# Patient Record
Sex: FEMALE | Race: OTHER
[De-identification: names, ages, dates, MRNs, and addresses within clinical notes are randomized per-mention and may not be internally consistent; named-entity substitution may affect disease eponyms.]

---

## 2017-09-08 ENCOUNTER — HOSPITAL ENCOUNTER (OUTPATIENT)
Dept: HOSPITAL 8 - CFH | Age: 37
Discharge: HOME | End: 2017-09-08
Attending: INTERNAL MEDICINE
Payer: COMMERCIAL

## 2017-09-08 DIAGNOSIS — I07.1: Primary | ICD-10-CM

## 2017-09-08 PROCEDURE — 93306 TTE W/DOPPLER COMPLETE: CPT

## 2018-11-09 ENCOUNTER — NON-PROVIDER VISIT (OUTPATIENT)
Dept: OBGYN | Facility: CLINIC | Age: 38
End: 2018-11-09

## 2018-11-09 DIAGNOSIS — N92.6 MISSED PERIOD: ICD-10-CM

## 2018-11-09 LAB
INT CON NEG: NEGATIVE
INT CON POS: POSITIVE
POC URINE PREGNANCY TEST: POSITIVE

## 2018-11-09 PROCEDURE — 81025 URINE PREGNANCY TEST: CPT | Performed by: OBSTETRICS & GYNECOLOGY

## 2018-12-18 ENCOUNTER — INITIAL PRENATAL (OUTPATIENT)
Dept: OBGYN | Facility: CLINIC | Age: 38
End: 2018-12-18
Payer: COMMERCIAL

## 2018-12-18 VITALS — HEIGHT: 62 IN | BODY MASS INDEX: 34.6 KG/M2 | WEIGHT: 188 LBS

## 2018-12-18 DIAGNOSIS — Z32.01 ENCOUNTER FOR PREGNANCY TEST, RESULT POSITIVE: ICD-10-CM

## 2018-12-18 DIAGNOSIS — N92.6 MISSED MENSES: ICD-10-CM

## 2018-12-18 DIAGNOSIS — O09.299 HISTORY OF POSTPARTUM HEMORRHAGE, CURRENTLY PREGNANT: ICD-10-CM

## 2018-12-18 PROCEDURE — 76830 TRANSVAGINAL US NON-OB: CPT | Performed by: OBSTETRICS & GYNECOLOGY

## 2018-12-18 PROCEDURE — 99202 OFFICE O/P NEW SF 15 MIN: CPT | Mod: 25 | Performed by: OBSTETRICS & GYNECOLOGY

## 2018-12-18 RX ORDER — HYDROXYZINE HYDROCHLORIDE 10 MG/1
10 TABLET, FILM COATED ORAL
Qty: 30 TAB | Refills: 5 | Status: SHIPPED | OUTPATIENT
Start: 2018-12-18 | End: 2019-05-18

## 2018-12-18 NOTE — PROGRESS NOTES
today. Mary about LMP  On PNV  Phone # [phone]  Pharmacy verified with patient  C/o difficulty sleeping and feeling very anxious   LH=[id number]

## 2018-12-18 NOTE — PROGRESS NOTES
Cc: Confirmation of pregnancy    HPI:  The patient is a 38 y.o.  10w2d based upon  Patient's last menstrual period was 10/07/2018.. She was using not birth control method. This was an un planned pregnancy.   She complains of inab ilit y to sleep and anxiety at night. She had a therapist and declines a referral at this time but is requesting something to help her sleep.  She presents for a confirmation of pregnancy.  She denies  fetal movement,  denies  vaginal bleeding,  denies  leakage of fluid,  denies contractions.   She denies nausea/vomiting, denies headache, and denies dysuria.      Review of systems:  Pertinent positives documented in HPI and all other systems reviewed & are negative    Gyn History: LMP1. regular q 28-30 days    Pregnancy related complications:  Hx PPH, Hx 36wks SROM    OB History    Para Term  AB Living   5 4 3 1   4   SAB TAB Ectopic Molar Multiple Live Births             4      # Outcome Date GA Lbr Alejandro/2nd Weight Sex Delivery Anes PTL Lv   5 Current            4 Term 11/03/10 39w0d  3.884 kg (8 lb 9 oz) M Vag-Spont None N NARDA      Complications: Postpartum hemorrhage      Birth Comments: HEMORRAGE, BREASTFEEDING    3 Term 08 40w0d 02:00 3.43 kg (7 lb 9 oz) M Vag-Spont None Y NARDA      Complications: Postpartum hemorrhage      Birth Comments: HTN at 38 weeks- no meds per pt. .    2  04 36w0d 00:30 3.345 kg (7 lb 6 oz) M Vag-Spont None N NARDA      Birth Comments: SROM 36weeks, baby stayed in ICU x 1 week   1 Term 03 40w0d 08:00 3.402 kg (7 lb 8 oz) M SPONTANEOUS EPI  NARDA        Past Medical History:   Diagnosis Date   • History of  delivery, currently pregnant 4/15/2010   • Supervision of normal pregnancy 4/15/2010   • Varicose vein of leg 4/15/2010     History reviewed. No pertinent surgical history.  Social History     Social History   • Marital status: Single     Spouse name: N/A   • Number of children: N/A   • Years of education:  "N/A     Occupational History   • Not on file.     Social History Main Topics   • Smoking status: Never Smoker   • Smokeless tobacco: Never Used   • Alcohol use No   • Drug use: No   • Sexual activity: Yes     Partners: Male     Other Topics Concern   • Not on file     Social History Narrative   • No narrative on file     Family History   Problem Relation Age of Onset   • Diabetes Paternal Grandmother         NIDDM   • Hypertension Paternal Grandmother      Allergies:   Allergies as of 12/18/2018 - Reviewed 12/18/2018   Allergen Reaction Noted   • Nkda [no known drug allergy]  11/03/2010       PE:    Height 1.575 m (5' 2\"), weight 85.3 kg (188 lb), last menstrual period 10/07/2018, currently breastfeeding.      General:appears stated age, is in no apparent distress  Head: normocephalic, non-tender  Neck: neck is supple, no jugular venous distension, no thyromegaly  Abdomen: Bowel sounds positive, nondistended, soft, nontender x4, no rebound or guarding. No organomegaly. No masses.  Female GYN: normal female external genitalia without lesions   exam deferred  Skin: No rashes, or ulcers or lesions seen  Psychiatric: Patient shows appropriate affect, is alert and oriented x3, intact judgment and insight.    transvaginal scan and per my read:    Indication: missed menses. Normal    Findings: amanda intrauterine pregnancy @ 10-6 weeks by CRL. Positive yolk sac. Positive fetal cardiac activity @ 173 BPM. Right ovary normal. Left Ovary normal. Cervical length 3.91cm. No free fluid in the cul-de-sac.    Impression: viable IUP @ 10-6 weeks. EDC by US of 7/10/2019    DATING: will keep SALVATORE By LMP of 7/14/2019    A/P:   1. Missed menses  REFERRAL TO PERINATOLOGY   2. History of postpartum hemorrhage, currently pregnant     3. Encounter for pregnancy test, result positive       Will send Atarax prn for sleep to pharmacy  Spent 20 minutes in face-to-face patient contact in which greater than 50% of that visit was spent in " counseling/coordination of care of newly diagnosed pregnancy including medical and surgical options of care.  1st trimester screening for Down Syndrome and neural tube defects discussed.  Patient will be referred to [facility] for AMA  SAB precautions discussed  F/u in 4 weeks for new OB visit  Increase water intake and encouraged healthy nutrition.  Begin prenatal vitamins.

## 2018-12-19 NOTE — PROGRESS NOTES
Referral faxed to Dr. Onofre on 12/19/18. They will contact patient to schedule appt. Please check with patient if appt was made and document.

## 2019-01-16 ENCOUNTER — INITIAL PRENATAL (OUTPATIENT)
Dept: OBGYN | Facility: CLINIC | Age: 39
End: 2019-01-16
Payer: COMMERCIAL

## 2019-01-16 ENCOUNTER — HOSPITAL ENCOUNTER (OUTPATIENT)
Facility: MEDICAL CENTER | Age: 39
End: 2019-01-16
Attending: NURSE PRACTITIONER
Payer: COMMERCIAL

## 2019-01-16 VITALS — DIASTOLIC BLOOD PRESSURE: 60 MMHG | BODY MASS INDEX: 34.93 KG/M2 | SYSTOLIC BLOOD PRESSURE: 118 MMHG | WEIGHT: 191 LBS

## 2019-01-16 DIAGNOSIS — Z34.80 SUPERVISION OF OTHER NORMAL PREGNANCY, ANTEPARTUM: Primary | ICD-10-CM

## 2019-01-16 PROBLEM — O09.892 SUPERVISION OF OTHER HIGH RISK PREGNANCIES, SECOND TRIMESTER: Status: ACTIVE | Noted: 2019-01-16

## 2019-01-16 PROBLEM — O09.522 ELDERLY MULTIGRAVIDA IN SECOND TRIMESTER: Status: ACTIVE | Noted: 2019-01-16

## 2019-01-16 LAB
APPEARANCE UR: NORMAL
BILIRUB UR STRIP-MCNC: NORMAL MG/DL
COLOR UR AUTO: NORMAL
GLUCOSE UR STRIP.AUTO-MCNC: NEGATIVE MG/DL
KETONES UR STRIP.AUTO-MCNC: NEGATIVE MG/DL
LEUKOCYTE ESTERASE UR QL STRIP.AUTO: NEGATIVE
NITRITE UR QL STRIP.AUTO: NEGATIVE
PH UR STRIP.AUTO: 7.5 [PH] (ref 5–8)
PROT UR QL STRIP: NEGATIVE MG/DL
RBC UR QL AUTO: NEGATIVE
SP GR UR STRIP.AUTO: 1.01
UROBILINOGEN UR STRIP-MCNC: NORMAL MG/DL

## 2019-01-16 PROCEDURE — 87591 N.GONORRHOEAE DNA AMP PROB: CPT

## 2019-01-16 PROCEDURE — 81002 URINALYSIS NONAUTO W/O SCOPE: CPT | Performed by: NURSE PRACTITIONER

## 2019-01-16 PROCEDURE — 59401 PR NEW OB VISIT: CPT | Performed by: NURSE PRACTITIONER

## 2019-01-16 PROCEDURE — 87491 CHLMYD TRACH DNA AMP PROBE: CPT

## 2019-01-16 PROCEDURE — 88175 CYTOPATH C/V AUTO FLUID REDO: CPT

## 2019-01-16 NOTE — PROGRESS NOTES
Pt. Here for NOB visit today.  # [phone]  Pt had a  visit on 12/18/18  Pt. states went to [facility] on 1/2/19 for pelvic pain and pressure, denies bleeding.   Pharmacy verified.   Chaperone offered and provided.   AFP refused, refusal form signed and scanned in media  Pt has JULIO, was given a copy of her lab slip and told to pay the fee at the lab.    Flu vaccine offered and declined.

## 2019-01-16 NOTE — LETTER
Estudios Para Detectar los Portadores de la Fibrosis Quística   (La Enfermedad Fibroquística del Páncreas)    [facility]    Esta información esta relacionada con un examen de larry que puede determinar si usted o stauffer lizzette es portador del gen que causa la enfermedad hereditaria que se llama la fibrosis quística.     ¿QUE ES LA ENFERMEDAD FIBROSIS QUÍSTICA?  · La  fibrosis quística es roddy enfermedad hereditaria que afecta a más de 25,000 niños y jóvenes adultos americanos.  · Los síntomas de la fibrosis quística varían de roddy persona a otra.  Los síntomas más comunes son congestión pulmonar, diarrea y retraso en el crecimiento del teagan.  La mayoría de las personas con la fibrosis quística padecen de problemas médicos muy severos, y algunas mueren jóvenes.  Otras tienen tan pocos síntomas que no se percatan de que tienen fibrosis quística.  · La fibrosis quística no afecta en absoluto la inteligencia de la persona.  · Aunque actualmente no hay roddy dudley para la fibrosis quística, los científicos están avanzando con respecto a nuevos tratamientos y en la búsqueda de la dudley.  Anteriormente la mayoría de las personas que padecían de fibrosis quística morían muy jóvenes.  Hoy en día, muchas personas que padecen de la fibrosis quística sobreviven hasta los 20 o 30 anos de edad.    ¿EXISTE LA POSIBILIDAD DE QUE MI BHARAT PUEDA TENER FIBROSIS QUÍSTICA?  · Usted puede tener un hijo con la fibrosis quística aun cuando no hay nadie en stauffer roseanne que la padezca.  (Larry la grafica que sigue.)  · Existe roddy prueba que puede ayudarle a determinar si flory un gen para la fibrosis quística y si por eso corre un riesgo de tener un hijo con la enfermedad.  · Si ambos padres son portadores del gen para la fibrosis quística, hay roddy (1) probabilidad entre 4 (25%) en cada embarazo, de que puedan tener un hijo afectada con fibrosis quística.  · Los portadores del gen para la fibrosis quística tienen roddy copia del gen  normal y el otro gen alterado.  · Las personas con fibrosis quística tienen dos genes alterados para la fibrosis quística,  · Normalmente la mayoría de individuos tienen dos copias normales del gen para fibrosis quística.    Riesgo aproximado de que roddy lizzette sin familiares con fibrosis quística pueda tener un hijo con fibrosis quística:  Origen / Riesgo  Roddy lizzette Caucásica (de anca dorothy):  1 en 2,500  Roddy lizzette :  1 en 8,000  Roddy lizzette Afroamericana (de anca ela):  1 en 15,000  Roddy lizzette Asiática:  1 en 32,000    ¿EXISTEN PRUEBAS PARA DETECTAR LOS PORTADORES DE LA FIBROSIS QUÍSTICA?  · Hay roddy prueba de larry para determinar si usted o stauffer lizzette portan el gen para la fibrosis quística.  · Es importante entender que la prueba no detecta todos los portadores del gen para la fibrosis quística.  · Si la prueba determina que ambos padres con portadores, se puede realizar otras pruebas para determinar si stauffer futuro tia esta afectado.    ¿CUANTO ANAT LA PRUEBA PARA DETERMINAR SI SOY PORTADOR(A) DEL GEN PARA LA FIBROSIS QUÍSTICA?  · El costo de la prueba varia según stauffer póliza de seguro medico y el laboratorio donde se efectúa el análisis.  · El costo promedio de la prueba es de $300.  · Stauffer consejera genética puede darle mas información acera de esta prueba para determinar si usted es portador de la fibrosis quística.    _____  Si, yo estoy interesada y me gustaria obtener mas información al respecto.  _____  No tengo interés ni en hacerme la prueba para determinar si soy portador(a) de gen para la fibrosis quística ni en recibir mas información al respecto.    Firma del paciente: _____________________________   1/16/2019

## 2019-01-16 NOTE — LETTER
Estudios Para Detectar los Portadores de la Fibrosis Quística   (La Enfermedad Fibroquística del Páncreas)    [facility]    Esta información esta relacionada con un examen de larry que puede determinar si usted o stauffer lizzette es portador del gen que causa la enfermedad hereditaria que se llama la fibrosis quística.     ¿QUE ES LA ENFERMEDAD FIBROSIS QUÍSTICA?  · La  fibrosis quística es roddy enfermedad hereditaria que afecta a más de 25,000 niños y jóvenes adultos americanos.  · Los síntomas de la fibrosis quística varían de roddy persona a otra.  Los síntomas más comunes son congestión pulmonar, diarrea y retraso en el crecimiento del teagan.  La mayoría de las personas con la fibrosis quística padecen de problemas médicos muy severos, y algunas mueren jóvenes.  Otras tienen tan pocos síntomas que no se percatan de que tienen fibrosis quística.  · La fibrosis quística no afecta en absoluto la inteligencia de la persona.  · Aunque actualmente no hay roddy dudley para la fibrosis quística, los científicos están avanzando con respecto a nuevos tratamientos y en la búsqueda de la dudley.  Anteriormente la mayoría de las personas que padecían de fibrosis quística morían muy jóvenes.  Hoy en día, muchas personas que padecen de la fibrosis quística sobreviven hasta los 20 o 30 anos de edad.    ¿EXISTE LA POSIBILIDAD DE QUE MI BHARAT PUEDA TENER FIBROSIS QUÍSTICA?  · Usted puede tener un hijo con la fibrosis quística aun cuando no hay nadie en stauffer roseanne que la padezca.  (Larry la grafica que sigue.)  · Existe roddy prueba que puede ayudarle a determinar si flory un gen para la fibrosis quística y si por eso corre un riesgo de tener un hijo con la enfermedad.  · Si ambos padres son portadores del gen para la fibrosis quística, hay roddy (1) probabilidad entre 4 (25%) en cada embarazo, de que puedan tener un hijo afectada con fibrosis quística.  · Los portadores del gen para la fibrosis quística tienen roddy copia del gen  normal y el otro gen alterado.  · Las personas con fibrosis quística tienen dos genes alterados para la fibrosis quística,  · Normalmente la mayoría de individuos tienen dos copias normales del gen para fibrosis quística.    Riesgo aproximado de que roddy lizzette sin familiares con fibrosis quística pueda tener un hijo con fibrosis quística:  Origen / Riesgo  Roddy lizzette Caucásica (de anca dorothy):  1 en 2,500  Roddy lizzette :  1 en 8,000  Roddy lizzette Afroamericana (de anca ela):  1 en 15,000  Roddy lizzette Asiática:  1 en 32,000    ¿EXISTEN PRUEBAS PARA DETECTAR LOS PORTADORES DE LA FIBROSIS QUÍSTICA?  · Hay roddy prueba de larry para determinar si usted o stauffer lizzette portan el gen para la fibrosis quística.  · Es importante entender que la prueba no detecta todos los portadores del gen para la fibrosis quística.  · Si la prueba determina que ambos padres con portadores, se puede realizar otras pruebas para determinar si stauffer futuro tia esta afectado.    ¿CUANTO ANAT LA PRUEBA PARA DETERMINAR SI SOY PORTADOR(A) DEL GEN PARA LA FIBROSIS QUÍSTICA?  · El costo de la prueba varia según stauffer póliza de seguro medico y el laboratorio donde se efectúa el análisis.  · El costo promedio de la prueba es de $300.  · Stauffer consejera genética puede darle mas información acera de esta prueba para determinar si usted es portador de la fibrosis quística.    _____  Si, yo estoy interesada y me gustaria obtener mas información al respecto.  _____  No tengo interés ni en hacerme la prueba para determinar si soy portador(a) de gen para la fibrosis quística ni en recibir mas información al respecto.    Firma del paciente: _____________________________   1/16/2019

## 2019-01-16 NOTE — PROGRESS NOTES
Subjective:   Claire Cobian is a 38 y.o.   @ EGA: 14w3d SALVATORE: Estimated Date of Delivery: 19  per US who presents for her new OB exam.  She reports being seen at [facility] for pelvic pain and told she may have a fibroid.  Declines AFP.  Declines CF.  Reports good FM.  Denies VB, LOF, or cramping.  Denies dysuria, vaginal DC.  Pt is  and lives with  and children. Not presently working.  Pregnancy is unplanned but wanted.       Initial Prenatal Visit        HPI  Review of Systems   All other systems reviewed and are negative.       Objective:     Wt 86.6 kg (191 lb)   LMP 10/07/2018   BMI 34.93 kg/m²    Wet mount: deferred  FHTs: 158  Fundal ht: 14     Physical Exam   Constitutional: She is oriented to person, place, and time. She appears well-developed and well-nourished.   HENT:   Head: Normocephalic and atraumatic.   Eyes:   Eye and ear exam deferred   Neck: Normal range of motion. Neck supple. No thyromegaly present.   Cardiovascular: Normal rate, regular rhythm, normal heart sounds and intact distal pulses.    Pulmonary/Chest: Effort normal and breath sounds normal. Right breast exhibits no inverted nipple, no mass, no nipple discharge, no skin change and no tenderness. Left breast exhibits no inverted nipple, no mass, no nipple discharge, no skin change and no tenderness.   Abdominal: Soft. Bowel sounds are normal.   Genitourinary: Vagina normal and uterus normal. Pelvic exam was performed with patient supine. There is no rash, tenderness, lesion or injury on the right labia. There is no rash, tenderness, lesion or injury on the left labia. Cervix exhibits no motion tenderness, no discharge and no friability. Right adnexum displays no mass, no tenderness and no fullness. Left adnexum displays no mass, no tenderness and no fullness.   Genitourinary Comments:  x 4 - proven to 3800g   Musculoskeletal: Normal range of motion.   Neurological: She is alert and oriented  to person, place, and time.   Skin: Skin is warm and dry. Capillary refill takes less than 2 seconds.   Psychiatric: She has a normal mood and affect. Her behavior is normal. Judgment and thought content normal.   Nursing note and vitals reviewed.          A:   1.  IUP @ 14w3d SALVATORE: Estimated Date of Delivery: 7/14/19 per US         2.  S=D        3.    Patient Active Problem List    Diagnosis Date Noted   • Supervision of other high risk pregnancies, second trimester 01/16/2019   • Elderly multigravida in second trimester 01/16/2019   • History of postpartum hemorrhage, currently pregnant 12/18/2018   • HTN (hypertension)-late last pregnancy without meds.  10/28/2010   • Acid reflux 04/15/2010   • Varicose vein of leg 04/15/2010     P:  1.  GC/CT done. Pap done.         2.  Prenatal labs ordered - lab slip given        3.  Discussed PNV, diet, and adequate water intake        4.  NOB packet given        5.  Return to office in 4 wks        6.  Complete OB US in 6 wks        7.  Oki appt declined since pt will be paying out of pocket.         8.  Flu vaccine declined.         9.  D/w pt helps for pelvic pain - rec'd pregnancy belt.       10.  Rec'd compression hose for varicose veins.          Chaperone offered and provided by Nyasia Schneider MA.

## 2019-01-17 DIAGNOSIS — Z34.80 SUPERVISION OF OTHER NORMAL PREGNANCY, ANTEPARTUM: ICD-10-CM

## 2019-01-17 LAB
C TRACH DNA GENITAL QL NAA+PROBE: NEGATIVE
CYTOLOGY REG CYTOL: NORMAL
N GONORRHOEA DNA GENITAL QL NAA+PROBE: NEGATIVE
SPECIMEN SOURCE: NORMAL

## 2019-01-17 NOTE — PATIENT INSTRUCTIONS
P:  1.  GC/CT done. Pap done.         2.  Prenatal labs ordered - lab slip given        3.  Discussed PNV, diet, and adequate water intake        4.  NOB packet given        5.  Return to office in 4 wks        6.  Complete OB US in 6 wks        7.  Oki appt declined since pt will be paying out of pocket.         8.  Flu vaccine declined.         9.  D/w pt helps for pelvic pain - rec'd pregnancy belt.       10.  Rec'd compression hose for varicose veins.

## 2019-01-23 ENCOUNTER — APPOINTMENT (OUTPATIENT)
Dept: OBGYN | Facility: CLINIC | Age: 39
End: 2019-01-23
Payer: COMMERCIAL

## 2019-02-11 ENCOUNTER — HOSPITAL ENCOUNTER (OUTPATIENT)
Dept: LAB | Facility: MEDICAL CENTER | Age: 39
End: 2019-02-11
Attending: NURSE PRACTITIONER
Payer: COMMERCIAL

## 2019-02-11 DIAGNOSIS — Z34.80 SUPERVISION OF OTHER NORMAL PREGNANCY, ANTEPARTUM: ICD-10-CM

## 2019-02-11 LAB
ABO GROUP BLD: NORMAL
APPEARANCE UR: CLEAR
BACTERIA #/AREA URNS HPF: ABNORMAL /HPF
BASOPHILS # BLD AUTO: 0.5 % (ref 0–1.8)
BASOPHILS # BLD: 0.05 K/UL (ref 0–0.12)
BILIRUB UR QL STRIP.AUTO: NEGATIVE
BLD GP AB SCN SERPL QL: NORMAL
COLOR UR: YELLOW
EOSINOPHIL # BLD AUTO: 0.16 K/UL (ref 0–0.51)
EOSINOPHIL NFR BLD: 1.6 % (ref 0–6.9)
EPI CELLS #/AREA URNS HPF: ABNORMAL /HPF
ERYTHROCYTE [DISTWIDTH] IN BLOOD BY AUTOMATED COUNT: 42.6 FL (ref 35.9–50)
GLUCOSE UR STRIP.AUTO-MCNC: NEGATIVE MG/DL
HBV SURFACE AG SER QL: NEGATIVE
HCT VFR BLD AUTO: 38.9 % (ref 37–47)
HGB BLD-MCNC: 13.1 G/DL (ref 12–16)
HIV 1+2 AB+HIV1 P24 AG SERPL QL IA: NON REACTIVE
HYALINE CASTS #/AREA URNS LPF: ABNORMAL /LPF
IMM GRANULOCYTES # BLD AUTO: 0.02 K/UL (ref 0–0.11)
IMM GRANULOCYTES NFR BLD AUTO: 0.2 % (ref 0–0.9)
KETONES UR STRIP.AUTO-MCNC: NEGATIVE MG/DL
LEUKOCYTE ESTERASE UR QL STRIP.AUTO: ABNORMAL
LYMPHOCYTES # BLD AUTO: 2.37 K/UL (ref 1–4.8)
LYMPHOCYTES NFR BLD: 24.1 % (ref 22–41)
MCH RBC QN AUTO: 30.6 PG (ref 27–33)
MCHC RBC AUTO-ENTMCNC: 33.7 G/DL (ref 33.6–35)
MCV RBC AUTO: 90.9 FL (ref 81.4–97.8)
MICRO URNS: ABNORMAL
MONOCYTES # BLD AUTO: 0.64 K/UL (ref 0–0.85)
MONOCYTES NFR BLD AUTO: 6.5 % (ref 0–13.4)
NEUTROPHILS # BLD AUTO: 6.59 K/UL (ref 2–7.15)
NEUTROPHILS NFR BLD: 67.1 % (ref 44–72)
NITRITE UR QL STRIP.AUTO: NEGATIVE
NRBC # BLD AUTO: 0 K/UL
NRBC BLD-RTO: 0 /100 WBC
PH UR STRIP.AUTO: 8 [PH]
PLATELET # BLD AUTO: 217 K/UL (ref 164–446)
PMV BLD AUTO: 11.6 FL (ref 9–12.9)
PROT UR QL STRIP: NEGATIVE MG/DL
RBC # BLD AUTO: 4.28 M/UL (ref 4.2–5.4)
RBC # URNS HPF: ABNORMAL /HPF
RBC UR QL AUTO: NEGATIVE
RH BLD: NORMAL
RUBV AB SER QL: 196.6 IU/ML
SP GR UR STRIP.AUTO: 1.02
TREPONEMA PALLIDUM IGG+IGM AB [PRESENCE] IN SERUM OR PLASMA BY IMMUNOASSAY: NON REACTIVE
UROBILINOGEN UR STRIP.AUTO-MCNC: 0.2 MG/DL
WBC # BLD AUTO: 9.8 K/UL (ref 4.8–10.8)
WBC #/AREA URNS HPF: ABNORMAL /HPF

## 2019-02-11 PROCEDURE — 86780 TREPONEMA PALLIDUM: CPT

## 2019-02-11 PROCEDURE — 86850 RBC ANTIBODY SCREEN: CPT

## 2019-02-11 PROCEDURE — 86900 BLOOD TYPING SEROLOGIC ABO: CPT

## 2019-02-11 PROCEDURE — 86901 BLOOD TYPING SEROLOGIC RH(D): CPT

## 2019-02-11 PROCEDURE — 87389 HIV-1 AG W/HIV-1&-2 AB AG IA: CPT

## 2019-02-11 PROCEDURE — 87340 HEPATITIS B SURFACE AG IA: CPT

## 2019-02-11 PROCEDURE — 36415 COLL VENOUS BLD VENIPUNCTURE: CPT

## 2019-02-11 PROCEDURE — 86762 RUBELLA ANTIBODY: CPT

## 2019-02-11 PROCEDURE — 81001 URINALYSIS AUTO W/SCOPE: CPT

## 2019-02-11 PROCEDURE — 85025 COMPLETE CBC W/AUTO DIFF WBC: CPT

## 2019-02-13 ENCOUNTER — ROUTINE PRENATAL (OUTPATIENT)
Dept: OBGYN | Facility: CLINIC | Age: 39
End: 2019-02-13
Payer: COMMERCIAL

## 2019-02-13 VITALS — WEIGHT: 200 LBS | BODY MASS INDEX: 36.58 KG/M2 | DIASTOLIC BLOOD PRESSURE: 60 MMHG | SYSTOLIC BLOOD PRESSURE: 118 MMHG

## 2019-02-13 DIAGNOSIS — O09.892 SUPERVISION OF OTHER HIGH RISK PREGNANCIES, SECOND TRIMESTER: ICD-10-CM

## 2019-02-13 PROCEDURE — 90040 PR PRENATAL FOLLOW UP: CPT | Performed by: NURSE PRACTITIONER

## 2019-02-13 NOTE — PROGRESS NOTES
Pt here today for OB follow up  Reports +FM  WT: 200 lb  BP: 118/60  US on 02/27/2019  Pt states she has a fibroma on her left lower Q, states the pain goes to her back, pt also reports leg tiredness due to her varicose veins.  States no other concerns.   Declines AFP Screening on 01/16/19  Declines the influenza vaccine  Good # [phone]

## 2019-02-13 NOTE — PROGRESS NOTES
SUBJECTIVE:  Pt is a 38 y.o.   at 18w3d  gestation. Presents today for follow-up prenatal care. Reports no issues at this time.  Reports fetal movement. Denies cramping/contractions, bleeding or leaking of fluid. Denies dysuria, headaches, N/V, or other issues at this time. Generally feels well today.     OBJECTIVE:  - See prenatal vitals flow  -   Vitals:    19 1111   Weight: 90.7 kg (200 lb)                 ASSESSMENT:   - IUP at 18w3d    - S=D   -   Patient Active Problem List    Diagnosis Date Noted   • Supervision of other high risk pregnancies, second trimester 2019   • Elderly multigravida in second trimester 2019   • History of postpartum hemorrhage, currently pregnant 2018   • HTN (hypertension)-late last pregnancy without meds.  10/28/2010   • Varicose vein of leg 04/15/2010         PLAN:  - S/sx pregnancy and labor warning signs vs general discomforts discussed  - Fetal movements and kick counts reviewed   - Adequate hydration reinforced  - Nutrition/exercise/vitamin education; continued PNV  - Reviewed 10 lb weight gain in one month; exercise and pregnancy nutrition reviewed   - Anticipatory guidance given  - RTC in 4 weeks for follow-up prenatal care

## 2019-02-27 ENCOUNTER — APPOINTMENT (OUTPATIENT)
Dept: RADIOLOGY | Facility: IMAGING CENTER | Age: 39
End: 2019-02-27
Attending: NURSE PRACTITIONER
Payer: COMMERCIAL

## 2019-02-27 DIAGNOSIS — Z34.80 SUPERVISION OF OTHER NORMAL PREGNANCY, ANTEPARTUM: ICD-10-CM

## 2019-02-27 PROCEDURE — 76805 OB US >/= 14 WKS SNGL FETUS: CPT | Performed by: OBSTETRICS & GYNECOLOGY

## 2019-02-27 PROCEDURE — 76805 OB US >/= 14 WKS SNGL FETUS: CPT | Mod: 26 | Performed by: NURSE PRACTITIONER

## 2019-03-13 ENCOUNTER — ROUTINE PRENATAL (OUTPATIENT)
Dept: OBGYN | Facility: CLINIC | Age: 39
End: 2019-03-13
Payer: COMMERCIAL

## 2019-03-13 VITALS — WEIGHT: 199 LBS | DIASTOLIC BLOOD PRESSURE: 60 MMHG | BODY MASS INDEX: 36.4 KG/M2 | SYSTOLIC BLOOD PRESSURE: 108 MMHG

## 2019-03-13 DIAGNOSIS — O09.892 SUPERVISION OF OTHER HIGH RISK PREGNANCIES, SECOND TRIMESTER: ICD-10-CM

## 2019-03-13 PROCEDURE — 90040 PR PRENATAL FOLLOW UP: CPT | Performed by: PHYSICIAN ASSISTANT

## 2019-03-13 NOTE — PROGRESS NOTES
Pt has no complaints with cramping, bleeding or pain, but pt still has pain in varicose veins in legs, despite wearing support hose daily. Pt doesn't work, so tries to not be on feet for more than 30 min, also suggested that pt try to elevated legs when pain occurs and to continue wearing support hose daily. +FM. US results d/w pt - all wnl, pt notified. Pt mentions hx of fibroid, but no mention on US. Will recheck after delivery, but anticipate  at this time. 1hr GTT next visit. RTC 4 wk or sooner prn.

## 2019-03-13 NOTE — PROGRESS NOTES
Pt. Here for OB/FU. Reports Good FM.   Good # [phone]  Pt states she has wearing compression hose but states she is still having leg pains.   Pharmacy verified.

## 2019-04-09 ENCOUNTER — ROUTINE PRENATAL (OUTPATIENT)
Dept: OBGYN | Facility: CLINIC | Age: 39
End: 2019-04-09
Payer: COMMERCIAL

## 2019-04-09 VITALS — WEIGHT: 204 LBS | DIASTOLIC BLOOD PRESSURE: 64 MMHG | SYSTOLIC BLOOD PRESSURE: 112 MMHG | BODY MASS INDEX: 37.31 KG/M2

## 2019-04-09 DIAGNOSIS — O09.892 SUPERVISION OF OTHER HIGH RISK PREGNANCIES, SECOND TRIMESTER: ICD-10-CM

## 2019-04-09 PROCEDURE — 90040 PR PRENATAL FOLLOW UP: CPT | Performed by: PHYSICIAN ASSISTANT

## 2019-04-09 NOTE — PROGRESS NOTES
Pt here today for OB follow up  Pt states pelvic pain  Reports +FM  Good # [phone]   Pharmacy Confirmed.  Chaperone offered and provided.

## 2019-04-09 NOTE — PROGRESS NOTES
Pt has no complaints with cramping, bleeding or pain, but pt has had worsening insomnia, despite walking daily and working as . Pt also has incr pressure. Pt also has incr BETTENCOURT, despite drinking 6 glasses of water daily, so urged to incr water intake and take Tylenol prn. Pt doesn't drink caffeine. Pt to call if not improving. +FM. 1hr GTT, H/H, RPR slip given with instructions. RTC 4 wk or sooner prn.

## 2019-04-23 ENCOUNTER — HOSPITAL ENCOUNTER (OUTPATIENT)
Dept: LAB | Facility: MEDICAL CENTER | Age: 39
End: 2019-04-23
Attending: PHYSICIAN ASSISTANT
Payer: COMMERCIAL

## 2019-04-23 DIAGNOSIS — O09.892 SUPERVISION OF OTHER HIGH RISK PREGNANCIES, SECOND TRIMESTER: ICD-10-CM

## 2019-04-23 LAB
GLUCOSE 1H P 50 G GLC PO SERPL-MCNC: 131 MG/DL (ref 70–139)
TREPONEMA PALLIDUM IGG+IGM AB [PRESENCE] IN SERUM OR PLASMA BY IMMUNOASSAY: NON REACTIVE

## 2019-04-23 PROCEDURE — 86780 TREPONEMA PALLIDUM: CPT

## 2019-04-23 PROCEDURE — 85018 HEMOGLOBIN: CPT

## 2019-04-23 PROCEDURE — 36415 COLL VENOUS BLD VENIPUNCTURE: CPT

## 2019-04-23 PROCEDURE — 85014 HEMATOCRIT: CPT

## 2019-04-23 PROCEDURE — 82950 GLUCOSE TEST: CPT

## 2019-04-24 LAB
HCT VFR BLD AUTO: 41.7 % (ref 37–47)
HGB BLD-MCNC: 13.3 G/DL (ref 12–16)

## 2019-05-07 ENCOUNTER — ROUTINE PRENATAL (OUTPATIENT)
Dept: OBGYN | Facility: CLINIC | Age: 39
End: 2019-05-07
Payer: COMMERCIAL

## 2019-05-07 VITALS — WEIGHT: 209 LBS | BODY MASS INDEX: 38.23 KG/M2 | SYSTOLIC BLOOD PRESSURE: 110 MMHG | DIASTOLIC BLOOD PRESSURE: 60 MMHG

## 2019-05-07 DIAGNOSIS — O09.892 SUPERVISION OF OTHER HIGH RISK PREGNANCIES, SECOND TRIMESTER: ICD-10-CM

## 2019-05-07 PROCEDURE — 90040 PR PRENATAL FOLLOW UP: CPT | Performed by: PHYSICIAN ASSISTANT

## 2019-05-07 NOTE — PROGRESS NOTES
Pt has no complaints with cramping, UCs, Vb, LOF. +FM - FKC sheet given today. 1hr GTT, H/H, RPR wnl - pt notified of results. Unsure about TDaP - wants to read more and will let us know. Signed BTL consent today. Pt is asking if she can get a primary C/S for hx of PPH x 2- pt very concerned about risk again, and would be sure she wants BTL if C/S needed - will have pt f/u with MD next visit to discuss if C/S possible. Also, pt mentions fibroid but none seen on 1st US here, but pt notes it was at [facility], so US records requested today. RTC 2 wk or sooner prn.

## 2019-05-07 NOTE — PROGRESS NOTES
Pt here today for OB follow up  Pt states no complaints  Reports +FM  Good # [phone]   Pharmacy Confirmed.  Chaperone offered and Provider.   RANDAL sheet instructions given  Tdap  Pt desires BTL

## 2019-05-07 NOTE — LETTER
Cuente los Movimientos de stauffer Bebé  Otro paso importante para la lane de stauffer bebé    Claire Garcia-Lm     [facility]            Dept: [phone]    ¿Cuántas semanas tiene de embarazo? 30w2d    Fecha cuando tiene que comenzar a contar el movimiento: 5/7/19                  Aaron debe usar jaclyn diagrama    Roddy manera en que stauffer doctor puede controlar a lane de stauffer bebé es sabiendo cuantas veces se mueve stauffer bebé en el útero, o por medio de las “pataditas”.  Usted podrá ayudarle a stauffer médico al usar cada día el siguiente diagrama.    Cada día, usted debe prestar atención a cuantas horas le lleva a stauffer bebé moverse 10 veces.  Comience a contar en la mañana, lo antes posible después de haberse levantado.    · Primeramente, escriba la hora en que se mueve stauffer bebé, hasta llegar a 10 veces.  · Colóquele un check o palomita a cada cuadrito cada vez que stauffer bebé se mueva hasta que complete 10 veces.  · Escriba la hora cuando termine de contar 10 veces en la última columna.  · Sume el total del tiempo que le llevó contar los 10 movimientos.  · Finalmente, complete el cuadrito de cuantas horas le llevó hacerlo.    Después de padmini contado los 10 movimientos, ya no tendrá que contar los demás movimientos por el vinnie del día.  A la mañana siguiente, comience a contar de nuevo cuantas veces se mueve el bebé desde el momento en que se levante.    ¿Qué tendría que considerarse un “movimiento”?  Es difícil de decirlo porque es distinto de roddy madre a otra, y de un embarazo a otro.  Lo importante es que cuente el movimiento de la misma manera jeffry el transcurso de stauffer embarazo.  Si tiene preguntas adicionales, pregúntele a stauffer doctor.    ¡Cuente cuidadosamente cada día!     MUESTRA:  Semana 28    ¿Cuántas horas le ha llevado sentir 10 movimientos?        Hora de Inicio     1     2     3     4     5     6     7     8     9     10   Hora de Finlizar   Zora. 8:20 ·  ·  ·  ·  ·  ·  ·  ·  ·  ·  11:40   Mar.               Mié.                Jue.               Vie.               Sáb.               Dom.                 IMPORTANTE:  Usted debe contactar a stauffer doctor si le lleva más de 2 horas sentir 10 movimientos de stauffer bebé.    Cada mañana, escriba la hora de inicio y comience a contar los movimientos de stauffer bebé.  Hágalo colocándole un check o palomita a cada cuadrito cada vez que sienta un movimiento de stauffer bebé.  Cuando haya sentido 10 “pataditas”, escriba la hora en que terminó de contar en la última columna.  Luego, complete en la cajita (arriba de la akua de check o palomita) el número total de horas que le llevó hacerlo.  Asegúrese de leer completamente las instrucciones en la página anterior.

## 2019-05-21 ENCOUNTER — ROUTINE PRENATAL (OUTPATIENT)
Dept: OBGYN | Facility: CLINIC | Age: 39
End: 2019-05-21
Payer: COMMERCIAL

## 2019-05-21 ENCOUNTER — HOSPITAL ENCOUNTER (OUTPATIENT)
Facility: MEDICAL CENTER | Age: 39
End: 2019-05-21
Attending: OBSTETRICS & GYNECOLOGY
Payer: COMMERCIAL

## 2019-05-21 VITALS — BODY MASS INDEX: 38.41 KG/M2 | SYSTOLIC BLOOD PRESSURE: 110 MMHG | DIASTOLIC BLOOD PRESSURE: 70 MMHG | WEIGHT: 210 LBS

## 2019-05-21 DIAGNOSIS — N89.8 VAGINAL DISCHARGE DURING PREGNANCY IN THIRD TRIMESTER: ICD-10-CM

## 2019-05-21 DIAGNOSIS — O26.893 VAGINAL DISCHARGE DURING PREGNANCY IN THIRD TRIMESTER: ICD-10-CM

## 2019-05-21 DIAGNOSIS — O09.892 SUPERVISION OF OTHER HIGH RISK PREGNANCIES, SECOND TRIMESTER: ICD-10-CM

## 2019-05-21 DIAGNOSIS — O09.299 HISTORY OF POSTPARTUM HEMORRHAGE, CURRENTLY PREGNANT: ICD-10-CM

## 2019-05-21 PROCEDURE — 90040 PR PRENATAL FOLLOW UP: CPT | Performed by: OBSTETRICS & GYNECOLOGY

## 2019-05-21 PROCEDURE — 87480 CANDIDA DNA DIR PROBE: CPT

## 2019-05-21 PROCEDURE — 87660 TRICHOMONAS VAGIN DIR PROBE: CPT

## 2019-05-21 PROCEDURE — 87510 GARDNER VAG DNA DIR PROBE: CPT

## 2019-05-21 NOTE — PROGRESS NOTES
Pt here today for OB follow up  Pt states she constantly has discharge with an odor.   Reports +  Good # [phone]   Pharmacy Confirmed.  Chaperone offered and not indicated.   Pt declines TDAP

## 2019-05-21 NOTE — PROGRESS NOTES
S: Pt presents for routine OB follow up.  No contractions, vaginal bleeding, or leaking fluids. Good fetal movement.  She complains of watery discharge that has an undescribable smell. Denies itching or burning.   Patient states she is very anxious about this delivery, given especially that she had a PPH with each of her past pregnancies. She is requesting a primary  section with BTL for this baby.     O: /70   Wt 95.3 kg (210 lb)   LMP 10/07/2018   BMI 38.41 kg/m²   Patients' weight gain, fluid intake and exercise level discussed.  Vitals, fundal height , fetal position, and FHR reviewed on flowsheet    SSE: physiololgic discharge in the vault, cervix closed appearing. Vaginal pathogens taken.   FH: 135  Fundus: 32cm    Lab:No results found for this or any previous visit (from the past 336 hour(s)).    Prenatal labs:  T&S: B+/ ab neg  Hep B: neg  T.Pall: non reactive  Rubella: immune  HIV: neg  Pap smear: NILM 2019  Quad: not assessed  GBS: not done yet  1 hour: 131   3 hour: na    Level II: posterior placenta, no previa, normal anatomy.     A/P:  39 y.o.  at 32w2d with previous history of postpartum hemorrhage x 2 presents for routine obstetric follow-up.   - declines tdap today  - Delivery plan: Patient is requesting for primary  section with bilateral tubal ligation.  I advised patient that a previous history of postpartum hemorrhage is not in a medical indication for primary  section.  I advised the patient that she may have more bleeding during a  section then a vaginal delivery.  I advised her also that she likely had atony which contributed to her postpartum hemorrhage the past previous 2 pregnancies and that a  section is not necessarily going to prevent this.  Patient adamantly states that she is very anxious and does not want to have a vaginal delivery.  She requests for a elective primary  section with bilateral tubal ligation.  I advised  patient that I would bring it to our  for further discussion and notify her of decision at that time.  - Postpartum prophylaxis: signed BTL papers  - Continue prenatal vitamins.  -  labor precautions and fetal kick counts   - Increase water intake.  - Follow-up in 2 weeks.

## 2019-05-22 DIAGNOSIS — N89.8 VAGINAL DISCHARGE DURING PREGNANCY IN THIRD TRIMESTER: ICD-10-CM

## 2019-05-22 DIAGNOSIS — O26.893 VAGINAL DISCHARGE DURING PREGNANCY IN THIRD TRIMESTER: ICD-10-CM

## 2019-05-22 LAB
CANDIDA DNA VAG QL PROBE+SIG AMP: NEGATIVE
G VAGINALIS DNA VAG QL PROBE+SIG AMP: NEGATIVE
T VAGINALIS DNA VAG QL PROBE+SIG AMP: NEGATIVE

## 2019-05-23 ENCOUNTER — TELEPHONE (OUTPATIENT)
Dept: OBGYN | Facility: CLINIC | Age: 39
End: 2019-05-23

## 2019-05-23 DIAGNOSIS — Z34.93 ENCOUNTER FOR SUPERVISION OF NORMAL PREGNANCY IN THIRD TRIMESTER, UNSPECIFIED GRAVIDITY: ICD-10-CM

## 2019-05-23 NOTE — LETTER
June 7, 2019        Claire Amato  [address]  [place] [address]        Dear Claire:    I hope you are doing well!    You were referred to our Maternal Bonding and Support Program for mothers. This is a program that offers mental health support and treatment for mothers who may be experiencing symptoms of depression and/or anxiety during pregnancy or after the birth of their children.      If you would like to make an appointment, please contact [phone] to schedule an appointment with DELORES Muñoz.         If you have any questions or concerns, please don't hesitate to call.        Sincerely,        JOVANY Martines.    Electronically Signed

## 2019-05-24 NOTE — TELEPHONE ENCOUNTER
----- Message from Wisam Elias sent at 5/23/2019  8:48 AM PDT -----  Hey  This is the patient we spoke about yesterday with the Hx of PPH and anxiety. She will be booked for a primary section but I wanted to run her by you as well to see if you could see her at some point. She is 32 weeks now.     Thanks!  Curt

## 2019-05-30 ENCOUNTER — HOSPITAL ENCOUNTER (OUTPATIENT)
Facility: MEDICAL CENTER | Age: 39
End: 2019-05-30
Attending: OBSTETRICS & GYNECOLOGY | Admitting: OBSTETRICS & GYNECOLOGY
Payer: COMMERCIAL

## 2019-06-04 ENCOUNTER — APPOINTMENT (OUTPATIENT)
Dept: LAB | Facility: MEDICAL CENTER | Age: 39
End: 2019-06-04
Attending: NURSE PRACTITIONER
Payer: COMMERCIAL

## 2019-06-04 ENCOUNTER — ROUTINE PRENATAL (OUTPATIENT)
Dept: OBGYN | Facility: CLINIC | Age: 39
End: 2019-06-04
Payer: COMMERCIAL

## 2019-06-04 VITALS — BODY MASS INDEX: 38.78 KG/M2 | SYSTOLIC BLOOD PRESSURE: 120 MMHG | WEIGHT: 212 LBS | DIASTOLIC BLOOD PRESSURE: 68 MMHG

## 2019-06-04 DIAGNOSIS — L29.9 ITCHING: ICD-10-CM

## 2019-06-04 DIAGNOSIS — Z86.59 HISTORY OF ANXIETY: ICD-10-CM

## 2019-06-04 PROBLEM — O09.523 ELDERLY MULTIGRAVIDA IN THIRD TRIMESTER: Status: ACTIVE | Noted: 2019-01-16

## 2019-06-04 PROBLEM — O09.893 SUPERVISION OF OTHER HIGH RISK PREGNANCIES, THIRD TRIMESTER: Status: ACTIVE | Noted: 2019-01-16

## 2019-06-04 PROCEDURE — 90040 PR PRENATAL FOLLOW UP: CPT | Performed by: NURSE PRACTITIONER

## 2019-06-04 NOTE — PROGRESS NOTES
Pt here today for OB follow up  Reports +FM  WT: 212 lb  BP: 120/68  Pt states she sometimes feels some contraction son her lower back. Pt also reports having a lot of itchiness all over her body but mainly on her feet and palms of hands x 1 week.   Good # [phone]  Patient is scheduled for a primary C/S with BTL:  Pre-op: 07/01/19 @ 8:00am. C/S: 07/07/19 @ 9:30am with [place]. Post-op: 07/15/19 @ 8:00am. Pt notified today and instructions given.

## 2019-06-04 NOTE — PROGRESS NOTES
SUBJECTIVE:  Pt is a 39 y.o.   at 34w2d  gestation. Presents today for follow-up prenatal care. Reports no issues at this time.  Reports good fetal movement. Denies cramping/contractions, bleeding or leaking of fluid. Denies dysuria, headaches, N/V. Reports full body itching, more at night, more on hands and feet.     OBJECTIVE:  - See prenatal vitals flow  -   Vitals:    19 0914   BP: 120/68   Weight: 96.2 kg (212 lb)                 ASSESSMENT:   - IUP at 34w2d    - S=D   -   Patient Active Problem List    Diagnosis Date Noted   • History of anxiety 2019   • Supervision of other high risk pregnancies, third trimester 2019   • Elderly multigravida in third trimester 2019   • History of PPHx 2 - desires primary C/S and BTL (requested) 2018   • HTN (hypertension)-late last pregnancy without meds.  10/28/2010   • Varicose vein of leg 04/15/2010         PLAN:  - S/sx pregnancy and labor warning signs vs general discomforts discussed  - Fetal movements and/or kick counts reviewed   - Adequate hydration reinforced  - Nutrition/exercise/vitamin education; continue PNV  - C/s info given  - Plans for breastfeeding  - Anticipatory guidance given  - RTC in 1 weeks for follow-up prenatal care  - Bile acids asap; benadryl in meantime

## 2019-06-05 ENCOUNTER — HOSPITAL ENCOUNTER (OUTPATIENT)
Dept: LAB | Facility: MEDICAL CENTER | Age: 39
End: 2019-06-05
Attending: NURSE PRACTITIONER
Payer: COMMERCIAL

## 2019-06-05 DIAGNOSIS — L29.9 ITCHING: ICD-10-CM

## 2019-06-05 PROCEDURE — 36415 COLL VENOUS BLD VENIPUNCTURE: CPT

## 2019-06-05 PROCEDURE — 82239 BILE ACIDS TOTAL: CPT

## 2019-06-06 LAB — BILE AC SERPL-SCNC: 15 UMOL/L (ref 0–10)

## 2019-06-11 ENCOUNTER — HOSPITAL ENCOUNTER (OUTPATIENT)
Facility: MEDICAL CENTER | Age: 39
End: 2019-06-11
Attending: NURSE PRACTITIONER
Payer: COMMERCIAL

## 2019-06-11 ENCOUNTER — ROUTINE PRENATAL (OUTPATIENT)
Dept: OBGYN | Facility: CLINIC | Age: 39
End: 2019-06-11
Payer: COMMERCIAL

## 2019-06-11 VITALS — DIASTOLIC BLOOD PRESSURE: 76 MMHG | BODY MASS INDEX: 38.78 KG/M2 | SYSTOLIC BLOOD PRESSURE: 112 MMHG | WEIGHT: 212 LBS

## 2019-06-11 DIAGNOSIS — O09.893 SUPERVISION OF OTHER HIGH RISK PREGNANCIES, THIRD TRIMESTER: Primary | ICD-10-CM

## 2019-06-11 DIAGNOSIS — O26.613 CHOLESTASIS DURING PREGNANCY IN THIRD TRIMESTER: Primary | ICD-10-CM

## 2019-06-11 DIAGNOSIS — O26.613 CHOLESTASIS DURING PREGNANCY IN THIRD TRIMESTER: ICD-10-CM

## 2019-06-11 DIAGNOSIS — K83.1 CHOLESTASIS DURING PREGNANCY IN THIRD TRIMESTER: Primary | ICD-10-CM

## 2019-06-11 DIAGNOSIS — K83.1 CHOLESTASIS DURING PREGNANCY IN THIRD TRIMESTER: ICD-10-CM

## 2019-06-11 DIAGNOSIS — O09.893 SUPERVISION OF OTHER HIGH RISK PREGNANCIES, THIRD TRIMESTER: ICD-10-CM

## 2019-06-11 LAB
NST ACOUSTIC STIMULATION: NORMAL
NST ACOUSTIC STIMULATION: NORMAL
NST ACTION NECESSARY: NORMAL
NST ACTION NECESSARY: NORMAL
NST ASSESSMENT: NORMAL
NST ASSESSMENT: NORMAL
NST BASELINE: 125
NST BASELINE: NORMAL
NST INDICATIONS: NORMAL
NST INDICATIONS: NORMAL
NST OTHER DATA: NORMAL
NST OTHER DATA: NORMAL
NST READ BY: NORMAL
NST READ BY: NORMAL
NST RETURN: NORMAL
NST RETURN: NORMAL
NST UTERINE ACTIVITY: NORMAL
NST UTERINE ACTIVITY: NORMAL

## 2019-06-11 PROCEDURE — 87081 CULTURE SCREEN ONLY: CPT

## 2019-06-11 PROCEDURE — 59025 FETAL NON-STRESS TEST: CPT | Performed by: NURSE PRACTITIONER

## 2019-06-11 PROCEDURE — 87150 DNA/RNA AMPLIFIED PROBE: CPT

## 2019-06-11 PROCEDURE — 90040 PR PRENATAL FOLLOW UP: CPT | Performed by: NURSE PRACTITIONER

## 2019-06-11 RX ORDER — URSODIOL 300 MG/1
300 CAPSULE ORAL 2 TIMES DAILY
Qty: 60 CAP | Refills: 3 | Status: SHIPPED | OUTPATIENT
Start: 2019-06-11

## 2019-06-11 NOTE — PATIENT INSTRUCTIONS
P:  1.  GBS obtained.  Advised pt to make payment at lab for test.         2.  Labor precautions given.  Instructions given on where to go.  Pt receptive to              education.          3.  Questions answered.          4.  D/w pt policies about need to reschedule CS for cholestasis. Referral placed again urgently.        5.  Continue FKCs.          6.  Encouraged adequate water intake        7.  F/u 1 wk HELIO, continue 2x/wk NSTs.         8.  PP contraception BTL.         9.  Rx for actigall sent to pharmacy.       10.  Reviewed cholestasis of pregnancy. Information given to pt.

## 2019-06-11 NOTE — PROGRESS NOTES
OB f/u. + fetal movement.  No VB, LOF or UC's.  Pt. States that she has an all over body rash.   Soles of feet and palms very itchy. Benadryl cream not working.   Wt:  212lbs     BP:112/76  Good phone # [phone]  Preferred pharmacy confirmed.  GBS today

## 2019-06-11 NOTE — PROGRESS NOTES
S:  Pt is  at 35w2d here for routine OB follow up.  Reports continued unimproving itching.  Reports good FM.  Denies VB, LOF, RUCs, or vaginal DC.     O:  Please see above vitals.        FHTs: 140        Fundal ht: 35 cm.        Fetal position: vertex.        NST obtained: baseline 125 +accels -decels mod variability.        [place]: Irr    A:  IUP at 35w2d  Reactive NST cat I FHTs  Patient Active Problem List    Diagnosis Date Noted   • Cholestasis during pregnancy in third trimester 2019   • History of anxiety 2019   • Supervision of other high risk pregnancies, third trimester 2019   • Elderly multigravida in third trimester 2019   • History of PPHx 2 - desires primary C/S and BTL (requested) 2018   • HTN (hypertension)-late last pregnancy without meds.  10/28/2010   • Varicose vein of leg 04/15/2010       P:  1.  GBS obtained.  Advised pt to make payment at lab for test.         2.  Labor precautions given.  Instructions given on where to go.  Pt receptive to              education.          3.  Questions answered.          4.  D/w pt policies about need to reschedule CS for cholestasis. Referral placed again urgently.        5.  Continue FKCs.          6.  Encouraged adequate water intake        7.  F/u 1 wk HELIO, continue 2x/wk NSTs.         8.  PP contraception BTL.         9.  Rx for actigall sent to pharmacy.       10.  Reviewed cholestasis of pregnancy. Information given to pt.        Chaperone offered and provided by Lexie Abdi MA.  Consulted w Dr. Elias regarding pt and POC.

## 2019-06-13 LAB — GP B STREP DNA SPEC QL NAA+PROBE: NEGATIVE

## 2019-06-14 ENCOUNTER — ROUTINE PRENATAL (OUTPATIENT)
Dept: OBGYN | Facility: CLINIC | Age: 39
End: 2019-06-14
Payer: COMMERCIAL

## 2019-06-14 DIAGNOSIS — O26.613 CHOLESTASIS DURING PREGNANCY IN THIRD TRIMESTER: ICD-10-CM

## 2019-06-14 DIAGNOSIS — K83.1 CHOLESTASIS DURING PREGNANCY IN THIRD TRIMESTER: ICD-10-CM

## 2019-06-14 LAB
NST ACOUSTIC STIMULATION: NORMAL
NST ACTION NECESSARY: NORMAL
NST ASSESSMENT: NORMAL
NST BASELINE: NORMAL
NST INDICATIONS: NORMAL
NST OTHER DATA: NORMAL
NST READ BY: NORMAL
NST RETURN: NORMAL
NST UTERINE ACTIVITY: NORMAL

## 2019-06-14 PROCEDURE — 59025 FETAL NON-STRESS TEST: CPT | Performed by: OBSTETRICS & GYNECOLOGY

## 2019-06-14 NOTE — PROGRESS NOTES
Claire Amato, a  at 35w5d with an SALVATORE of 2019, by Last Menstrual Period, was seen at [facility] for a nonstress test.    Nonstress Test  Reason for NST: Other (Comment) (cholestasis)  Variability: Moderate  Decelerations: None  Accelerations: Yes  Acoustic Stimulator: No  Baseline: 125  Uterine Irritability: No  Contractions: Not present  Nonstress Test Interpretation  Comments: as per my read, reactive NST

## 2019-06-18 ENCOUNTER — ROUTINE PRENATAL (OUTPATIENT)
Dept: OBGYN | Facility: CLINIC | Age: 39
End: 2019-06-18
Payer: COMMERCIAL

## 2019-06-18 ENCOUNTER — HOSPITAL ENCOUNTER (OUTPATIENT)
Facility: MEDICAL CENTER | Age: 39
End: 2019-06-18
Attending: OBSTETRICS & GYNECOLOGY | Admitting: OBSTETRICS & GYNECOLOGY
Payer: COMMERCIAL

## 2019-06-18 VITALS
DIASTOLIC BLOOD PRESSURE: 81 MMHG | WEIGHT: 212 LBS | HEIGHT: 64 IN | SYSTOLIC BLOOD PRESSURE: 125 MMHG | TEMPERATURE: 97.5 F | BODY MASS INDEX: 36.19 KG/M2 | HEART RATE: 64 BPM

## 2019-06-18 VITALS — BODY MASS INDEX: 38.78 KG/M2 | WEIGHT: 212 LBS | DIASTOLIC BLOOD PRESSURE: 76 MMHG | SYSTOLIC BLOOD PRESSURE: 132 MMHG

## 2019-06-18 DIAGNOSIS — O26.613 CHOLESTASIS DURING PREGNANCY IN THIRD TRIMESTER: Primary | ICD-10-CM

## 2019-06-18 DIAGNOSIS — O26.613 CHOLESTASIS DURING PREGNANCY IN THIRD TRIMESTER: ICD-10-CM

## 2019-06-18 DIAGNOSIS — I15.9 SECONDARY HYPERTENSION: ICD-10-CM

## 2019-06-18 DIAGNOSIS — O09.299 HISTORY OF POSTPARTUM HEMORRHAGE, CURRENTLY PREGNANT: ICD-10-CM

## 2019-06-18 DIAGNOSIS — K83.1 CHOLESTASIS DURING PREGNANCY IN THIRD TRIMESTER: ICD-10-CM

## 2019-06-18 DIAGNOSIS — O16.3 ELEVATED BLOOD PRESSURE AFFECTING PREGNANCY IN THIRD TRIMESTER, ANTEPARTUM: ICD-10-CM

## 2019-06-18 DIAGNOSIS — K83.1 CHOLESTASIS DURING PREGNANCY IN THIRD TRIMESTER: Primary | ICD-10-CM

## 2019-06-18 DIAGNOSIS — Z86.59 HISTORY OF ANXIETY: ICD-10-CM

## 2019-06-18 DIAGNOSIS — O09.893 SUPERVISION OF OTHER HIGH RISK PREGNANCIES, THIRD TRIMESTER: ICD-10-CM

## 2019-06-18 DIAGNOSIS — O09.523 ELDERLY MULTIGRAVIDA IN THIRD TRIMESTER: ICD-10-CM

## 2019-06-18 LAB
ALBUMIN SERPL BCP-MCNC: 3.2 G/DL (ref 3.2–4.9)
ALBUMIN/GLOB SERPL: 1 G/DL
ALP SERPL-CCNC: 228 U/L (ref 30–99)
ALT SERPL-CCNC: 126 U/L (ref 2–50)
AMORPH CRY #/AREA URNS HPF: PRESENT /HPF
ANION GAP SERPL CALC-SCNC: 8 MMOL/L (ref 0–11.9)
APPEARANCE UR: CLEAR
APPEARANCE UR: CLEAR
AST SERPL-CCNC: 58 U/L (ref 12–45)
BACTERIA #/AREA URNS HPF: NEGATIVE /HPF
BASOPHILS # BLD AUTO: 0.4 % (ref 0–1.8)
BASOPHILS # BLD: 0.03 K/UL (ref 0–0.12)
BILIRUB SERPL-MCNC: 0.6 MG/DL (ref 0.1–1.5)
BILIRUB UR QL STRIP.AUTO: NEGATIVE
BUN SERPL-MCNC: 11 MG/DL (ref 8–22)
CALCIUM SERPL-MCNC: 9 MG/DL (ref 8.5–10.5)
CHLORIDE SERPL-SCNC: 104 MMOL/L (ref 96–112)
CO2 SERPL-SCNC: 22 MMOL/L (ref 20–33)
COLOR UR AUTO: YELLOW
COLOR UR: YELLOW
CREAT SERPL-MCNC: 0.54 MG/DL (ref 0.5–1.4)
CREAT UR-MCNC: 56.1 MG/DL
EOSINOPHIL # BLD AUTO: 0.09 K/UL (ref 0–0.51)
EOSINOPHIL NFR BLD: 1.2 % (ref 0–6.9)
EPI CELLS #/AREA URNS HPF: NORMAL /HPF
ERYTHROCYTE [DISTWIDTH] IN BLOOD BY AUTOMATED COUNT: 40.8 FL (ref 35.9–50)
GLOBULIN SER CALC-MCNC: 3.2 G/DL (ref 1.9–3.5)
GLUCOSE SERPL-MCNC: 77 MG/DL (ref 65–99)
GLUCOSE UR QL STRIP.AUTO: NEGATIVE MG/DL
GLUCOSE UR STRIP.AUTO-MCNC: NEGATIVE MG/DL
HCT VFR BLD AUTO: 40.2 % (ref 37–47)
HGB BLD-MCNC: 13.5 G/DL (ref 12–16)
HYALINE CASTS #/AREA URNS LPF: NORMAL /LPF
IMM GRANULOCYTES # BLD AUTO: 0.03 K/UL (ref 0–0.11)
IMM GRANULOCYTES NFR BLD AUTO: 0.4 % (ref 0–0.9)
KETONES UR QL STRIP.AUTO: NEGATIVE MG/DL
KETONES UR STRIP.AUTO-MCNC: NEGATIVE MG/DL
LEUKOCYTE ESTERASE UR QL STRIP.AUTO: ABNORMAL
LEUKOCYTE ESTERASE UR QL STRIP.AUTO: ABNORMAL
LYMPHOCYTES # BLD AUTO: 2.18 K/UL (ref 1–4.8)
LYMPHOCYTES NFR BLD: 29.4 % (ref 22–41)
MCH RBC QN AUTO: 29.5 PG (ref 27–33)
MCHC RBC AUTO-ENTMCNC: 33.6 G/DL (ref 33.6–35)
MCV RBC AUTO: 87.8 FL (ref 81.4–97.8)
MICRO URNS: ABNORMAL
MONOCYTES # BLD AUTO: 0.43 K/UL (ref 0–0.85)
MONOCYTES NFR BLD AUTO: 5.8 % (ref 0–13.4)
NEUTROPHILS # BLD AUTO: 4.66 K/UL (ref 2–7.15)
NEUTROPHILS NFR BLD: 62.8 % (ref 44–72)
NITRITE UR QL STRIP.AUTO: NEGATIVE
NITRITE UR QL STRIP.AUTO: NEGATIVE
NRBC # BLD AUTO: 0 K/UL
NRBC BLD-RTO: 0 /100 WBC
NST ACOUSTIC STIMULATION: ABNORMAL
NST ACTION NECESSARY: ABNORMAL
NST ASSESSMENT: ABNORMAL
NST BASELINE: 135
NST INDICATIONS: ABNORMAL
NST OTHER DATA: ABNORMAL
NST READ BY: ABNORMAL
NST RETURN: ABNORMAL
NST UTERINE ACTIVITY: ABNORMAL
PH UR STRIP.AUTO: 6 [PH]
PH UR STRIP.AUTO: 6.5 [PH]
PLATELET # BLD AUTO: 195 K/UL (ref 164–446)
PMV BLD AUTO: 12.5 FL (ref 9–12.9)
POTASSIUM SERPL-SCNC: 3.8 MMOL/L (ref 3.6–5.5)
PROT SERPL-MCNC: 6.4 G/DL (ref 6–8.2)
PROT UR QL STRIP: NEGATIVE MG/DL
PROT UR QL STRIP: NEGATIVE MG/DL
PROT UR-MCNC: 6.3 MG/DL (ref 0–15)
PROT/CREAT UR: 112 MG/G (ref 10–107)
RBC # BLD AUTO: 4.58 M/UL (ref 4.2–5.4)
RBC # URNS HPF: NORMAL /HPF
RBC UR QL AUTO: NEGATIVE
RBC UR QL AUTO: NEGATIVE
SODIUM SERPL-SCNC: 134 MMOL/L (ref 135–145)
SP GR UR STRIP.AUTO: <=1.005
SP GR UR: 1.01
URATE SERPL-MCNC: 5.6 MG/DL (ref 1.9–8.2)
UROBILINOGEN UR STRIP.AUTO-MCNC: 0.2 MG/DL
WBC # BLD AUTO: 7.4 K/UL (ref 4.8–10.8)
WBC #/AREA URNS HPF: NORMAL /HPF

## 2019-06-18 PROCEDURE — 36415 COLL VENOUS BLD VENIPUNCTURE: CPT

## 2019-06-18 PROCEDURE — 59025 FETAL NON-STRESS TEST: CPT | Performed by: NURSE PRACTITIONER

## 2019-06-18 PROCEDURE — 99213 OFFICE O/P EST LOW 20 MIN: CPT | Mod: 25 | Performed by: PHYSICIAN ASSISTANT

## 2019-06-18 PROCEDURE — 80053 COMPREHEN METABOLIC PANEL: CPT

## 2019-06-18 PROCEDURE — 81002 URINALYSIS NONAUTO W/O SCOPE: CPT

## 2019-06-18 PROCEDURE — 84156 ASSAY OF PROTEIN URINE: CPT

## 2019-06-18 PROCEDURE — 59025 FETAL NON-STRESS TEST: CPT

## 2019-06-18 PROCEDURE — 85025 COMPLETE CBC W/AUTO DIFF WBC: CPT

## 2019-06-18 PROCEDURE — 90040 PR PRENATAL FOLLOW UP: CPT | Performed by: OBSTETRICS & GYNECOLOGY

## 2019-06-18 PROCEDURE — 82570 ASSAY OF URINE CREATININE: CPT

## 2019-06-18 PROCEDURE — 81001 URINALYSIS AUTO W/SCOPE: CPT

## 2019-06-18 PROCEDURE — 84550 ASSAY OF BLOOD/URIC ACID: CPT

## 2019-06-18 NOTE — H&P
History and Physical    Claire Amato is a 39 y.o. female  -Para:     Gestational Age:  36w2d  CC: Elevated BPs at [facility] today for NST  [facility]    HPI: Patient is admitted with the above mentioned Chief Complaint and States   Loss of fluid:   negative  Abdominal Pain:  negative  Uterine Contractions:  Few, mild  Vaginal Bleeding:  negative  Fetal Movement:  normal  Patient denies fever, chills, nausea, vomiting , headache, visual disturbance, or dysuria  Patient's last menstrual period was 10/07/2018.  Estimated Date of Delivery: 19  Final SALVATORE: 2019, by Last Menstrual Period    Patient Active Problem List    Diagnosis Date Noted   • Cholestasis during pregnancy in third trimester 2019   • History of anxiety 2019   • Supervision of other high risk pregnancies, third trimester 2019   • Elderly multigravida in third trimester 2019   • History of PPHx 2 - desires primary C/S and BTL (requested) 2018   • HTN (hypertension)-late last pregnancy without meds.  10/28/2010   • Varicose vein of leg 04/15/2010       Admitting DX: Pregnancy  Pregnancy Complications:  Cholestasis, Hx PPH, HTN, AMA  OB Risk Factors:   See above  Labor State:    Not in labor.    History:   has a past medical history of Varicose vein of leg (4/15/2010).     has no past surgical history on file.    OB History    Para Term  AB Living   5 4 3 1   4   SAB TAB Ectopic Molar Multiple Live Births             4      # Outcome Date GA Lbr Alejandro/2nd Weight Sex Delivery Anes PTL Lv   5 Current            4 Term 11/03/10 39w0d  3.884 kg (8 lb 9 oz) M Vag-Spont None N NARDA      Complications: Postpartum hemorrhage      Birth Comments: HEMORRAGE, BREASTFEEDING    3 Term 08 40w0d 02:00 3.43 kg (7 lb 9 oz) M Vag-Spont None Y NARDA      Complications: Postpartum hemorrhage      Birth Comments: HTN at 38 weeks- no meds per pt. .    2  04  "36w0d 00:30 3.345 kg (7 lb 6 oz) M Vag-Spont None N NARDA      Birth Comments: SROM 36weeks, baby stayed in ICU x 1 week   1 Term 08/28/03 40w0d 08:00 3.402 kg (7 lb 8 oz) M SPONTANEOUS EPI  NARDA          Medications:  No current facility-administered medications on file prior to encounter.      Current Outpatient Prescriptions on File Prior to Encounter   Medication Sig Dispense Refill   • Prenatal Vit w/Mx-Ebrsldqzp-PU (PNV PO) Take  by mouth.         Allergies:  Nkda [no known drug allergy]    ROS:   Neuro: negative    Cardiovascular: negative  Gastro intestinal: negative  Genitourinary: negative            Physical Exam:  BP (P) 125/81   Pulse (P) 64   Temp 36.4 °C (97.5 °F) (Temporal)   Ht 1.626 m (5' 4\")   Wt 96.2 kg (212 lb)   LMP 10/07/2018   BMI 36.39 kg/m²   Constitutional: healthy-appearing, Well-developed, well-nourished  No JVD: while supine  HEENT: PERRLA  Breast Exam: negative  Cardio: regular rate and rhythm  Lung: unlabored respirations, no intercostal retractions or accessory muscle use  Abdomen: abdomen is soft without significant tenderness, masses, organomegaly or guarding  Extremity: extremities, peripheral pulses and reflexes normal    Fetal Assessment: Fetal heart variability: moderate  Fetal Heart Rate decelerations: none  Fetal Heart Rate accelerations: yes  Baseline FHR: 140 per minute  Uterine contractions: irregular, every 5-10 minutes  Estimated Fetal Weight: 3000 - 3500g      Labs:  Recent Labs      06/18/19   1326   WBC  7.4   RBC  4.58   HEMOGLOBIN  13.5   HEMATOCRIT  40.2   MCV  87.8   MCH  29.5   MCHC  33.6   RDW  40.8   PLATELETCT  195   MPV  12.5     Prenatal Results     General (Most Recent Result)     Test Value Date Time    ABO B  02/11/19 1018    Rh POS  02/11/19 1018    Antibody screen NEG  02/11/19 1018    HbA1c       Gonorrhea Negative  01/16/19 1609    Chlamydia  by PCR Negative  01/16/19 1609    Chlamydia by DNA Negative  04/15/10     RPR/Syphilus Non Reactive  " 04/23/19 0812    HSV 1/2 by PCR (non-serum)       HSV 1/2 (serum)       HSV 1       HSV 2       HPV (16)       HPV (18)       HPV (other)       HBsAg Negative  02/11/19 1018    HIV-1 HIV-2 Antibodies Non Reactive  02/11/19 1018    Rubella 196.60 IU/mL 02/11/19 1018    Tb             Pap Smear (Most Recent Result)     Test Value Date Time    Pap smear       Pap smear w/HPV       Pap smear w/CTNG       Pap smar w/HPV CTNG       Pap smear (reflex HPV ACUS)       Pap smear (reflex HPV ASCUS w/CTNG)  (See Report)   01/16/19 1609    Pathology gyn specimen  (See Report)   01/16/19 1609          Urinalysis (Most Recent Result)     Test Value Date Time    Urinalysis  Abnormal  (A)   (See Report)   06/18/19 1320    Urinalysis (culture if indicated)       POC urinalysis  (See Report)   01/16/19 1500    Urine drug screen       Urine drug screen (w/o conf)       Urine culture ([id number])       Urine culture ([id number])             1st Trimester     Test Value Date Time    Hgb       Hct       Fasting Glucose Tolerance       GTT, 1 hour       GTT, 2 hours       GTT, 3 hours             2nd Trimester     Test Value Date Time    Hgb 13.1 g/dL 02/11/19 1018    Hct 38.9 % 02/11/19 1018    Urinalysis       Urine Culture       AST       ALT       Uric Acid       Fasting Glucose Tolerance       GTT, 1 hour       GTT, 2 hours       GTT, 3 hours       Urine Protein/Creatinine Ratio             3rd Trimester     Test Value Date Time    Hgb 13.5 g/dL 06/18/19 1326    Hct 40.2 % 06/18/19 1326    Platelet count 195 K/uL 06/18/19 1326    GBS (JOHNSON BROTH) Negative  06/11/19 0909    GBS (Direct) Negative  06/11/19 0909    Urinalysis       Urine Culture       Urine Drug Screen       Urine Protein/Creatinine Ratio  Abnormal  (A)   (See Report)   06/18/19 1320          Congenital Disease Screening     Test Value Date Time    First Trimester Screen       Quad Screen       Sickle Cell       Thalassemia       Jarod-Sachs       Cystic Fibrosis Carrier  Study                     Assessment:  Gestational Age:  36w2d  Labor State:   Not in labor  Risk Factors:   advanced maternal age, cholestasis, Hx PPH, elevated BPs  Pregnancy Complications: see above    Patient Active Problem List    Diagnosis Date Noted   • Cholestasis during pregnancy in third trimester 06/11/2019   • History of anxiety 06/04/2019   • Supervision of other high risk pregnancies, third trimester 01/16/2019   • Elderly multigravida in third trimester 01/16/2019   • History of PPHx 2 - desires primary C/S and BTL (requested) 12/18/2018   • HTN (hypertension)-late last pregnancy without meds.  10/28/2010   • Varicose vein of leg 04/15/2010       Plan:   PIH labs, BPs stable today. CMP shows elevated LFTs, but no comparison, likley due to cholestasis, No preeclampsia symptoms - denies HA, blurred vision, edema, epigastric pain. D/w Dr Barnes and will plan for primary C/S in 2 days for hx PPH. Pt to take Actigal prior to surgery in 2 days. PIH precautions reviewed. Daily FKC recommended.    REMI Cuello.

## 2019-06-18 NOTE — DISCHARGE INSTRUCTIONS
"General Instructions:  · If you think you are in labor, time contractions (lying on your left side) from the beginning of one contraction to the beginning of the next contraction for at least one hour.  · Increase fluid intake: you should consume 10-12 8 oz glasses of non-caffeinated fluid per day.  · Report any pressure or burning on urination to your physician.  · Monitor fetal movement: If you notice an absence or decrease in fetal movement, drink a large glass of water and rest on your side.  If there is no increase in movement, call your physician or go to the hospital for further evaluation.  · Report any sudden, sharp abdominal pain.  · Report any bleeding.  Spotting or pinkish discharge is normal after vaginal exam.  You may also spot after sexual intercourse.    Hypertension During Pregnancy  Hypertension is also called high blood pressure. High blood pressure means that the force of your blood moving in your body is too strong. When you are pregnant, this condition should be watched carefully. It can cause problems for you and your baby.  Follow these instructions at home:  Eating and drinking  · Drink enough fluid to keep your pee (urine) clear or pale yellow.  · Eat healthy foods that are low in salt (sodium).  ¨ Do not add salt to your food.  ¨ Check labels on foods and drinks to see much salt is in them. Look on the label where you see \"Sodium.\"  Lifestyle  · Do not use any products that contain nicotine or tobacco, such as cigarettes and e-cigarettes. If you need help quitting, ask your doctor.  · Do not use alcohol.  · Avoid caffeine.  · Avoid stress. Rest and get plenty of sleep.  General instructions  · Take over-the-counter and prescription medicines only as told by your doctor.  · While lying down, lie on your left side. This keeps pressure off your baby.  · While sitting or lying down, raise (elevate) your feet. Try putting some pillows under your lower legs.  · Exercise regularly. Ask your " doctor what kinds of exercise are best for you.  · Keep all prenatal and follow-up visits as told by your doctor. This is important.  Contact a doctor if:  · You have symptoms that your doctor told you to watch for, such as:  ¨ Fever.  ¨ Throwing up (vomiting).  ¨ Headache.  Get help right away if:  · You have very bad pain in your belly (abdomen).  · You are throwing up, and this does not get better with treatment.  · You suddenly get swelling in your hands, ankles, or face.  · You gain 4 lb (1.8 kg) or more in 1 week.  · You get bleeding from your vagina.  · You have blood in your pee.  · You do not feel your baby moving as much as normal.  · You have a change in vision.  · You have muscle twitching or sudden tightening (spasms).  · You have trouble breathing.  · Your lips or fingernails turn blue.  This information is not intended to replace advice given to you by your health care provider. Make sure you discuss any questions you have with your health care provider.  Document Released: 01/20/2012 Document Revised: 08/29/2017 Document Reviewed: 08/29/2017  [employer] Interactive Patient Education © 2017 [employer]    Introduction  Patient Name: ________________________________________________ Patient Due Date: ____________________  What is a fetal movement count?  A fetal movement count is the number of times that you feel your baby move during a certain amount of time. This may also be called a fetal kick count. A fetal movement count is recommended for every pregnant woman. You may be asked to start counting fetal movements as early as week 28 of your pregnancy.  Pay attention to when your baby is most active. You may notice your baby's sleep and wake cycles. You may also notice things that make your baby move more. You should do a fetal movement count:  · When your baby is normally most active.  · At the same time each day.  A good time to count movements is while you are resting, after having something to eat  27-Apr-2019 18:15 and drink.  How do I count fetal movements?  7. Find a quiet, comfortable area. Sit, or lie down on your side.  8. Write down the date, the start time and stop time, and the number of movements that you felt between those two times. Take this information with you to your health care visits.  9. For 2 hours, count kicks, flutters, swishes, rolls, and jabs. You should feel at least 10 movements during 2 hours.  10. You may stop counting after you have felt 10 movements.  11. If you do not feel 10 movements in 2 hours, have something to eat and drink. Then, keep resting and counting for 1 hour. If you feel at least 4 movements during that hour, you may stop counting.  Contact a health care provider if:  · You feel fewer than 4 movements in 2 hours.  · Your baby is not moving like he or she usually does.  Date: ____________ Start time: ____________ Stop time: ____________ Movements: ____________  Date: ____________ Start time: ____________ Stop time: ____________ Movements: ____________  Date: ____________ Start time: ____________ Stop time: ____________ Movements: ____________  Date: ____________ Start time: ____________ Stop time: ____________ Movements: ____________  Date: ____________ Start time: ____________ Stop time: ____________ Movements: ____________  Date: ____________ Start time: ____________ Stop time: ____________ Movements: ____________  Date: ____________ Start time: ____________ Stop time: ____________ Movements: ____________  Date: ____________ Start time: ____________ Stop time: ____________ Movements: ____________  Date: ____________ Start time: ____________ Stop time: ____________ Movements: ____________  This information is not intended to replace advice given to you by your health care provider. Make sure you discuss any questions you have with your health care provider.  Document Released: 01/17/2008 Document Revised: 08/16/2017 Document Reviewed: 01/26/2017  [employer] Interactive Patient  Education © 2017 [employer]    Labor Instructions (37 - 39 weeks):  Call your physician or return to hospital if:  · You have regular contractions that get progressively closer, longer and stronger.  · Your water breaks (remember time and color).  · You have bleeding like a period.  · Your baby does not move enough to complete the daily kick counts (10 movements in 2 hours)  · Your baby moves much less often than on the days before or you have not felt your baby move all day.    Pre-term Labor (<37 weeks):  Call your physician or return to the hospital if:  · You have painless regular contractions more than 4 in one hour.  · Your water breaks (remember time and color).  · You have menstrual-like cramps, a low dull backache or pressure in your pelvis or back.  · Your baby does not move enough to complete the daily kick count (10 movements in 2 hours).  · Your baby moves much less often than on the days before or you have not felt your baby move all day.  · Please review the MEDICATION LIST section of your AFTER VISIT SUMMARY document.  · Take your medication as prescribed      Other Instructions:  Please carefully review your entire AFTER VISIT SUMMARY document for all discharge instructions.

## 2019-06-18 NOTE — PROGRESS NOTES
Pt here today for OB follow up / Pre OP  Reports +FM  WT: 212 lb  BP: 132/76  C/s scheduled fo r6/20/19 at 0930 with Dr. Grant. C/s check scheduled for 7/1/19 at 0800. Pt notified and instructions given today.   Pt states she has been getting occasional headaches, and still having itchiness on her body.  Good # [phone]

## 2019-06-18 NOTE — PROGRESS NOTES
HELIO:  36w2d    Patient reports she continues to be very itchy.  She endorses good fetal movement.  Also reports that she is intermittently been having a headache.  Reports a mild headache currently.  Has not tried to take any medications for this.  Denies any vision changes, right upper quadrant pain and does endorse bilateral pedal edema.  Denies vaginal bleeding, contractions, LOF.  Reports +FM.    LMP 10/07/2018    Bps: 132/76, 128/85, 122/82, 140/82, 144/82, 157/74, 140/82, 131/80, 138/84  gen: AAO, NAD  NST performed (see separate note)      A/P: 39 y.o.  @ 36w2d  #Prenatal care. PNL up to date, Rh+, GBS neg, glucola/3rd tri CBC.  RPR WNL  # FWB.  anatomy US wnl, FH appropriate, FHT + today, continue kick counts  #cholestasis.  Discussed that this condition will be cured with delivery but until delivery plan to conitnue ursodiol, delivery scheduled for   #Mild range Bps and intermittent HA.  Sending patient over to triage for PIH evaluation  # Labor precautions discussed   #Delivery plan: CS scheduled  ( early 2/2 cholestasis) if not needed to be done today.  Hospital expectations and preoperative instructions given to pt today  #Postpartum planning.  plans to BF, PPBCM - BTL  #RTC 2wks

## 2019-06-18 NOTE — PROGRESS NOTES
1239: Pt arrived to L&D after being sent from office for elevated BP. Placed on EFM and TOCO, reports +FM, denies LOF and VB. Denies painful contractions. Reports occasional mild headaches, states no need for tylenol. Denies swelling, severe epigastric pain, or visual disturbances. Pt diagnosed with cholestasis of pregnancy, prescribed Ursodiol. Scheduled for a primary  due to history of PPHx2, desires BTL.   1305: Dr. Barnes phoned RN, orders received for PIH workup. Per MD order, no need for bile acids at this time.  1410: Labs resulted, GINA PENALOZA/Dr. Barnes updated on pt status, serial Bps, and lab results. Pt has primary  scheduled for 19. DC order received.  1422: Tamsen at bedside. POC discussed, questions answered. Pt verbalized understanding and verified  appt on 19.  1432: Pt discharged home in stable condition. Extensive discharge teaching provided, discussed signs and symptoms of pre-eclampsia and reasons for coming in to be re-evaluated, verbalized understanding, all questions answered.

## 2019-06-20 ENCOUNTER — ANESTHESIA (OUTPATIENT)
Dept: OBGYN | Facility: MEDICAL CENTER | Age: 39
End: 2019-06-20
Payer: COMMERCIAL

## 2019-06-20 ENCOUNTER — HOSPITAL ENCOUNTER (INPATIENT)
Facility: MEDICAL CENTER | Age: 39
LOS: 3 days | End: 2019-06-23
Attending: OBSTETRICS & GYNECOLOGY | Admitting: OBSTETRICS & GYNECOLOGY
Payer: COMMERCIAL

## 2019-06-20 ENCOUNTER — ANESTHESIA EVENT (OUTPATIENT)
Dept: OBGYN | Facility: MEDICAL CENTER | Age: 39
End: 2019-06-20
Payer: COMMERCIAL

## 2019-06-20 DIAGNOSIS — G89.18 POST-OP PAIN: ICD-10-CM

## 2019-06-20 LAB
ABO GROUP BLD: NORMAL
ALBUMIN SERPL BCP-MCNC: 2.6 G/DL (ref 3.2–4.9)
ALBUMIN/GLOB SERPL: 1.1 G/DL
ALP SERPL-CCNC: 178 U/L (ref 30–99)
ALT SERPL-CCNC: 75 U/L (ref 2–50)
ANION GAP SERPL CALC-SCNC: 7 MMOL/L (ref 0–11.9)
AST SERPL-CCNC: 41 U/L (ref 12–45)
BASOPHILS # BLD AUTO: 0.6 % (ref 0–1.8)
BASOPHILS # BLD: 0.05 K/UL (ref 0–0.12)
BILIRUB SERPL-MCNC: 0.5 MG/DL (ref 0.1–1.5)
BLD GP AB SCN SERPL QL: NORMAL
BUN SERPL-MCNC: 13 MG/DL (ref 8–22)
CALCIUM SERPL-MCNC: 8.2 MG/DL (ref 8.5–10.5)
CHLORIDE SERPL-SCNC: 104 MMOL/L (ref 96–112)
CO2 SERPL-SCNC: 26 MMOL/L (ref 20–33)
CREAT SERPL-MCNC: 0.54 MG/DL (ref 0.5–1.4)
EOSINOPHIL # BLD AUTO: 0.14 K/UL (ref 0–0.51)
EOSINOPHIL NFR BLD: 1.7 % (ref 0–6.9)
ERYTHROCYTE [DISTWIDTH] IN BLOOD BY AUTOMATED COUNT: 41.8 FL (ref 35.9–50)
ERYTHROCYTE [DISTWIDTH] IN BLOOD BY AUTOMATED COUNT: 42 FL (ref 35.9–50)
GLOBULIN SER CALC-MCNC: 2.4 G/DL (ref 1.9–3.5)
GLUCOSE SERPL-MCNC: 98 MG/DL (ref 65–99)
HCT VFR BLD AUTO: 31.6 % (ref 37–47)
HCT VFR BLD AUTO: 42.9 % (ref 37–47)
HGB BLD-MCNC: 10.2 G/DL (ref 12–16)
HGB BLD-MCNC: 14.1 G/DL (ref 12–16)
HOLDING TUBE BB 8507: NORMAL
IMM GRANULOCYTES # BLD AUTO: 0.03 K/UL (ref 0–0.11)
IMM GRANULOCYTES NFR BLD AUTO: 0.4 % (ref 0–0.9)
LYMPHOCYTES # BLD AUTO: 2.39 K/UL (ref 1–4.8)
LYMPHOCYTES NFR BLD: 29.3 % (ref 22–41)
MCH RBC QN AUTO: 28 PG (ref 27–33)
MCH RBC QN AUTO: 29.3 PG (ref 27–33)
MCHC RBC AUTO-ENTMCNC: 31.4 G/DL (ref 33.6–35)
MCHC RBC AUTO-ENTMCNC: 32.9 G/DL (ref 33.6–35)
MCV RBC AUTO: 89 FL (ref 81.4–97.8)
MCV RBC AUTO: 89.2 FL (ref 81.4–97.8)
MONOCYTES # BLD AUTO: 0.55 K/UL (ref 0–0.85)
MONOCYTES NFR BLD AUTO: 6.7 % (ref 0–13.4)
NEUTROPHILS # BLD AUTO: 4.99 K/UL (ref 2–7.15)
NEUTROPHILS NFR BLD: 61.3 % (ref 44–72)
NRBC # BLD AUTO: 0 K/UL
NRBC BLD-RTO: 0 /100 WBC
PATHOLOGY CONSULT NOTE: NORMAL
PLATELET # BLD AUTO: 167 K/UL (ref 164–446)
PLATELET # BLD AUTO: 222 K/UL (ref 164–446)
PMV BLD AUTO: 12.5 FL (ref 9–12.9)
PMV BLD AUTO: 13.2 FL (ref 9–12.9)
POTASSIUM SERPL-SCNC: 3.8 MMOL/L (ref 3.6–5.5)
PROT SERPL-MCNC: 5 G/DL (ref 6–8.2)
RBC # BLD AUTO: 3.61 M/UL (ref 4.2–5.4)
RBC # BLD AUTO: 4.82 M/UL (ref 4.2–5.4)
RH BLD: NORMAL
SODIUM SERPL-SCNC: 137 MMOL/L (ref 135–145)
WBC # BLD AUTO: 13.2 K/UL (ref 4.8–10.8)
WBC # BLD AUTO: 8.2 K/UL (ref 4.8–10.8)

## 2019-06-20 PROCEDURE — 0UB70ZZ EXCISION OF BILATERAL FALLOPIAN TUBES, OPEN APPROACH: ICD-10-PCS | Performed by: OBSTETRICS & GYNECOLOGY

## 2019-06-20 PROCEDURE — 306828 HCHG ANES-TIME GENERAL: Performed by: OBSTETRICS & GYNECOLOGY

## 2019-06-20 PROCEDURE — 59514 CESAREAN DELIVERY ONLY: CPT | Performed by: OBSTETRICS & GYNECOLOGY

## 2019-06-20 PROCEDURE — 86850 RBC ANTIBODY SCREEN: CPT

## 2019-06-20 PROCEDURE — 36415 COLL VENOUS BLD VENIPUNCTURE: CPT

## 2019-06-20 PROCEDURE — 85025 COMPLETE CBC W/AUTO DIFF WBC: CPT

## 2019-06-20 PROCEDURE — 700101 HCHG RX REV CODE 250: Performed by: ANESTHESIOLOGY

## 2019-06-20 PROCEDURE — 304964 HCHG RECOVERY ROOM TIME 1HR: Performed by: OBSTETRICS & GYNECOLOGY

## 2019-06-20 PROCEDURE — 86900 BLOOD TYPING SEROLOGIC ABO: CPT

## 2019-06-20 PROCEDURE — 700111 HCHG RX REV CODE 636 W/ 250 OVERRIDE (IP): Performed by: OBSTETRICS & GYNECOLOGY

## 2019-06-20 PROCEDURE — 58611 LIGATE OVIDUCT(S) ADD-ON: CPT | Performed by: OBSTETRICS & GYNECOLOGY

## 2019-06-20 PROCEDURE — 59514 CESAREAN DELIVERY ONLY: CPT

## 2019-06-20 PROCEDURE — 700111 HCHG RX REV CODE 636 W/ 250 OVERRIDE (IP): Performed by: ANESTHESIOLOGY

## 2019-06-20 PROCEDURE — 700105 HCHG RX REV CODE 258: Performed by: ANESTHESIOLOGY

## 2019-06-20 PROCEDURE — 700102 HCHG RX REV CODE 250 W/ 637 OVERRIDE(OP): Performed by: ANESTHESIOLOGY

## 2019-06-20 PROCEDURE — 500429 HCHG DRESSING, INTERCEED

## 2019-06-20 PROCEDURE — 88302 TISSUE EXAM BY PATHOLOGIST: CPT

## 2019-06-20 PROCEDURE — 86901 BLOOD TYPING SEROLOGIC RH(D): CPT

## 2019-06-20 PROCEDURE — 85027 COMPLETE CBC AUTOMATED: CPT

## 2019-06-20 PROCEDURE — A9270 NON-COVERED ITEM OR SERVICE: HCPCS | Performed by: ANESTHESIOLOGY

## 2019-06-20 PROCEDURE — 770002 HCHG ROOM/CARE - OB PRIVATE (112)

## 2019-06-20 PROCEDURE — 700111 HCHG RX REV CODE 636 W/ 250 OVERRIDE (IP)

## 2019-06-20 PROCEDURE — 80053 COMPREHEN METABOLIC PANEL: CPT

## 2019-06-20 PROCEDURE — 305385 HCHG SURGICAL SERVICES 1/4 HOUR: Performed by: OBSTETRICS & GYNECOLOGY

## 2019-06-20 RX ORDER — KETOROLAC TROMETHAMINE 30 MG/ML
30 INJECTION, SOLUTION INTRAMUSCULAR; INTRAVENOUS EVERY 6 HOURS
Status: COMPLETED | OUTPATIENT
Start: 2019-06-20 | End: 2019-06-21

## 2019-06-20 RX ORDER — SODIUM CHLORIDE, SODIUM LACTATE, POTASSIUM CHLORIDE, CALCIUM CHLORIDE 600; 310; 30; 20 MG/100ML; MG/100ML; MG/100ML; MG/100ML
INJECTION, SOLUTION INTRAVENOUS PRN
Status: DISCONTINUED | OUTPATIENT
Start: 2019-06-20 | End: 2019-06-23 | Stop reason: HOSPADM

## 2019-06-20 RX ORDER — DIPHENHYDRAMINE HYDROCHLORIDE 50 MG/ML
12.5 INJECTION INTRAMUSCULAR; INTRAVENOUS EVERY 6 HOURS PRN
Status: ACTIVE | OUTPATIENT
Start: 2019-06-20 | End: 2019-06-21

## 2019-06-20 RX ORDER — SODIUM CHLORIDE, SODIUM GLUCONATE, SODIUM ACETATE, POTASSIUM CHLORIDE AND MAGNESIUM CHLORIDE 526; 502; 368; 37; 30 MG/100ML; MG/100ML; MG/100ML; MG/100ML; MG/100ML
INJECTION, SOLUTION INTRAVENOUS
Status: DISCONTINUED | OUTPATIENT
Start: 2019-06-20 | End: 2019-06-20 | Stop reason: SURG

## 2019-06-20 RX ORDER — HYDROMORPHONE HYDROCHLORIDE 1 MG/ML
0.2 INJECTION, SOLUTION INTRAMUSCULAR; INTRAVENOUS; SUBCUTANEOUS
Status: DISCONTINUED | OUTPATIENT
Start: 2019-06-20 | End: 2019-06-21

## 2019-06-20 RX ORDER — LABETALOL HYDROCHLORIDE 5 MG/ML
5 INJECTION, SOLUTION INTRAVENOUS
Status: CANCELLED | OUTPATIENT
Start: 2019-06-20

## 2019-06-20 RX ORDER — DIPHENHYDRAMINE HCL 25 MG
25 TABLET ORAL EVERY 6 HOURS PRN
Status: DISCONTINUED | OUTPATIENT
Start: 2019-06-20 | End: 2019-06-20

## 2019-06-20 RX ORDER — SODIUM CHLORIDE, SODIUM GLUCONATE, SODIUM ACETATE, POTASSIUM CHLORIDE AND MAGNESIUM CHLORIDE 526; 502; 368; 37; 30 MG/100ML; MG/100ML; MG/100ML; MG/100ML; MG/100ML
1500 INJECTION, SOLUTION INTRAVENOUS ONCE
Status: COMPLETED | OUTPATIENT
Start: 2019-06-20 | End: 2019-06-20

## 2019-06-20 RX ORDER — CARBOPROST TROMETHAMINE 250 UG/ML
250 INJECTION, SOLUTION INTRAMUSCULAR
Status: CANCELLED | OUTPATIENT
Start: 2019-06-20

## 2019-06-20 RX ORDER — MISOPROSTOL 200 UG/1
800 TABLET ORAL
Status: CANCELLED | OUTPATIENT
Start: 2019-06-20

## 2019-06-20 RX ORDER — CEFAZOLIN SODIUM 1 G/3ML
INJECTION, POWDER, FOR SOLUTION INTRAMUSCULAR; INTRAVENOUS PRN
Status: DISCONTINUED | OUTPATIENT
Start: 2019-06-20 | End: 2019-06-20 | Stop reason: SURG

## 2019-06-20 RX ORDER — OXYTOCIN 10 [USP'U]/ML
10 INJECTION, SOLUTION INTRAMUSCULAR; INTRAVENOUS ONCE
Status: CANCELLED | OUTPATIENT
Start: 2019-06-20 | End: 2019-06-20

## 2019-06-20 RX ORDER — HYDROMORPHONE HYDROCHLORIDE 1 MG/ML
0.2 INJECTION, SOLUTION INTRAMUSCULAR; INTRAVENOUS; SUBCUTANEOUS
Status: CANCELLED | OUTPATIENT
Start: 2019-06-20

## 2019-06-20 RX ORDER — DIPHENHYDRAMINE HYDROCHLORIDE 50 MG/ML
25 INJECTION INTRAMUSCULAR; INTRAVENOUS EVERY 6 HOURS PRN
Status: ACTIVE | OUTPATIENT
Start: 2019-06-20 | End: 2019-06-21

## 2019-06-20 RX ORDER — OXYCODONE HYDROCHLORIDE 10 MG/1
10 TABLET ORAL EVERY 4 HOURS PRN
Status: DISCONTINUED | OUTPATIENT
Start: 2019-06-20 | End: 2019-06-21

## 2019-06-20 RX ORDER — DIPHENHYDRAMINE HCL 25 MG
25 TABLET ORAL EVERY 6 HOURS PRN
Status: DISCONTINUED | OUTPATIENT
Start: 2019-06-21 | End: 2019-06-23 | Stop reason: HOSPADM

## 2019-06-20 RX ORDER — MEPERIDINE HYDROCHLORIDE 25 MG/ML
12.5 INJECTION INTRAMUSCULAR; INTRAVENOUS; SUBCUTANEOUS
Status: CANCELLED | OUTPATIENT
Start: 2019-06-20

## 2019-06-20 RX ORDER — OXYCODONE HCL 5 MG/5 ML
5 SOLUTION, ORAL ORAL
Status: CANCELLED | OUTPATIENT
Start: 2019-06-20

## 2019-06-20 RX ORDER — HYDRALAZINE HYDROCHLORIDE 20 MG/ML
5 INJECTION INTRAMUSCULAR; INTRAVENOUS
Status: CANCELLED | OUTPATIENT
Start: 2019-06-20

## 2019-06-20 RX ORDER — METHYLERGONOVINE MALEATE 0.2 MG/ML
0.2 INJECTION INTRAVENOUS
Status: CANCELLED | OUTPATIENT
Start: 2019-06-20

## 2019-06-20 RX ORDER — OXYCODONE HYDROCHLORIDE 5 MG/1
5 TABLET ORAL EVERY 4 HOURS PRN
Status: DISCONTINUED | OUTPATIENT
Start: 2019-06-20 | End: 2019-06-21

## 2019-06-20 RX ORDER — BUPIVACAINE HYDROCHLORIDE 7.5 MG/ML
INJECTION, SOLUTION INTRASPINAL PRN
Status: DISCONTINUED | OUTPATIENT
Start: 2019-06-20 | End: 2019-06-20 | Stop reason: SURG

## 2019-06-20 RX ORDER — DIPHENHYDRAMINE HYDROCHLORIDE 50 MG/ML
25 INJECTION INTRAMUSCULAR; INTRAVENOUS EVERY 6 HOURS PRN
Status: DISCONTINUED | OUTPATIENT
Start: 2019-06-20 | End: 2019-06-20

## 2019-06-20 RX ORDER — MISOPROSTOL 200 UG/1
800 TABLET ORAL
Status: DISCONTINUED | OUTPATIENT
Start: 2019-06-20 | End: 2019-06-23 | Stop reason: HOSPADM

## 2019-06-20 RX ORDER — ONDANSETRON 2 MG/ML
4 INJECTION INTRAMUSCULAR; INTRAVENOUS EVERY 6 HOURS PRN
Status: DISCONTINUED | OUTPATIENT
Start: 2019-06-20 | End: 2019-06-20

## 2019-06-20 RX ORDER — HYDROMORPHONE HYDROCHLORIDE 1 MG/ML
0.4 INJECTION, SOLUTION INTRAMUSCULAR; INTRAVENOUS; SUBCUTANEOUS
Status: DISCONTINUED | OUTPATIENT
Start: 2019-06-20 | End: 2019-06-21

## 2019-06-20 RX ORDER — SODIUM CHLORIDE, SODIUM LACTATE, POTASSIUM CHLORIDE, CALCIUM CHLORIDE 600; 310; 30; 20 MG/100ML; MG/100ML; MG/100ML; MG/100ML
INJECTION, SOLUTION INTRAVENOUS CONTINUOUS
Status: CANCELLED | OUTPATIENT
Start: 2019-06-20

## 2019-06-20 RX ORDER — MIDAZOLAM HYDROCHLORIDE 1 MG/ML
1 INJECTION INTRAMUSCULAR; INTRAVENOUS
Status: CANCELLED | OUTPATIENT
Start: 2019-06-20

## 2019-06-20 RX ORDER — ONDANSETRON 2 MG/ML
4 INJECTION INTRAMUSCULAR; INTRAVENOUS
Status: CANCELLED | OUTPATIENT
Start: 2019-06-20

## 2019-06-20 RX ORDER — ONDANSETRON 4 MG/1
4 TABLET, ORALLY DISINTEGRATING ORAL EVERY 6 HOURS PRN
Status: DISCONTINUED | OUTPATIENT
Start: 2019-06-21 | End: 2019-06-23 | Stop reason: HOSPADM

## 2019-06-20 RX ORDER — SIMETHICONE 80 MG
80 TABLET,CHEWABLE ORAL 4 TIMES DAILY PRN
Status: DISCONTINUED | OUTPATIENT
Start: 2019-06-20 | End: 2019-06-23 | Stop reason: HOSPADM

## 2019-06-20 RX ORDER — SODIUM CHLORIDE, SODIUM LACTATE, POTASSIUM CHLORIDE, CALCIUM CHLORIDE 600; 310; 30; 20 MG/100ML; MG/100ML; MG/100ML; MG/100ML
INJECTION, SOLUTION INTRAVENOUS CONTINUOUS
Status: DISCONTINUED | OUTPATIENT
Start: 2019-06-20 | End: 2019-06-23 | Stop reason: HOSPADM

## 2019-06-20 RX ORDER — METOCLOPRAMIDE HYDROCHLORIDE 5 MG/ML
10 INJECTION INTRAMUSCULAR; INTRAVENOUS ONCE
Status: COMPLETED | OUTPATIENT
Start: 2019-06-20 | End: 2019-06-20

## 2019-06-20 RX ORDER — HALOPERIDOL 5 MG/ML
1 INJECTION INTRAMUSCULAR
Status: CANCELLED | OUTPATIENT
Start: 2019-06-20

## 2019-06-20 RX ORDER — ONDANSETRON 2 MG/ML
4 INJECTION INTRAMUSCULAR; INTRAVENOUS EVERY 6 HOURS PRN
Status: ACTIVE | OUTPATIENT
Start: 2019-06-20 | End: 2019-06-21

## 2019-06-20 RX ORDER — CITRIC ACID/SODIUM CITRATE 334-500MG
30 SOLUTION, ORAL ORAL ONCE
Status: COMPLETED | OUTPATIENT
Start: 2019-06-20 | End: 2019-06-20

## 2019-06-20 RX ORDER — DIPHENHYDRAMINE HYDROCHLORIDE 50 MG/ML
12.5 INJECTION INTRAMUSCULAR; INTRAVENOUS
Status: CANCELLED | OUTPATIENT
Start: 2019-06-20

## 2019-06-20 RX ORDER — ONDANSETRON 2 MG/ML
4 INJECTION INTRAMUSCULAR; INTRAVENOUS EVERY 6 HOURS PRN
Status: DISCONTINUED | OUTPATIENT
Start: 2019-06-21 | End: 2019-06-23 | Stop reason: HOSPADM

## 2019-06-20 RX ORDER — PHENYLEPHRINE HYDROCHLORIDE 10 MG/ML
INJECTION, SOLUTION INTRAMUSCULAR; INTRAVENOUS; SUBCUTANEOUS PRN
Status: DISCONTINUED | OUTPATIENT
Start: 2019-06-20 | End: 2019-06-20 | Stop reason: SURG

## 2019-06-20 RX ORDER — ACETAMINOPHEN 500 MG
1000 TABLET ORAL EVERY 6 HOURS
Status: COMPLETED | OUTPATIENT
Start: 2019-06-20 | End: 2019-06-21

## 2019-06-20 RX ORDER — DOCUSATE SODIUM 100 MG/1
100 CAPSULE, LIQUID FILLED ORAL 2 TIMES DAILY PRN
Status: DISCONTINUED | OUTPATIENT
Start: 2019-06-20 | End: 2019-06-23 | Stop reason: HOSPADM

## 2019-06-20 RX ORDER — DIPHENHYDRAMINE HYDROCHLORIDE 50 MG/ML
25 INJECTION INTRAMUSCULAR; INTRAVENOUS EVERY 6 HOURS PRN
Status: DISCONTINUED | OUTPATIENT
Start: 2019-06-21 | End: 2019-06-23 | Stop reason: HOSPADM

## 2019-06-20 RX ORDER — OXYCODONE HCL 5 MG/5 ML
10 SOLUTION, ORAL ORAL
Status: CANCELLED | OUTPATIENT
Start: 2019-06-20

## 2019-06-20 RX ORDER — CEFAZOLIN SODIUM 1 G/3ML
1 INJECTION, POWDER, FOR SOLUTION INTRAMUSCULAR; INTRAVENOUS ONCE
Status: DISCONTINUED | OUTPATIENT
Start: 2019-06-20 | End: 2019-06-20 | Stop reason: HOSPADM

## 2019-06-20 RX ORDER — HYDROMORPHONE HYDROCHLORIDE 1 MG/ML
0.1 INJECTION, SOLUTION INTRAMUSCULAR; INTRAVENOUS; SUBCUTANEOUS
Status: CANCELLED | OUTPATIENT
Start: 2019-06-20

## 2019-06-20 RX ORDER — HYDROMORPHONE HYDROCHLORIDE 1 MG/ML
0.4 INJECTION, SOLUTION INTRAMUSCULAR; INTRAVENOUS; SUBCUTANEOUS
Status: CANCELLED | OUTPATIENT
Start: 2019-06-20

## 2019-06-20 RX ORDER — ONDANSETRON 4 MG/1
4 TABLET, ORALLY DISINTEGRATING ORAL EVERY 6 HOURS PRN
Status: DISCONTINUED | OUTPATIENT
Start: 2019-06-20 | End: 2019-06-20

## 2019-06-20 RX ORDER — MORPHINE SULFATE 0.5 MG/ML
INJECTION, SOLUTION EPIDURAL; INTRATHECAL; INTRAVENOUS PRN
Status: DISCONTINUED | OUTPATIENT
Start: 2019-06-20 | End: 2019-06-20 | Stop reason: SURG

## 2019-06-20 RX ADMIN — OXYTOCIN 1000 ML: 10 INJECTION, SOLUTION INTRAMUSCULAR; INTRAVENOUS at 10:14

## 2019-06-20 RX ADMIN — BUPIVACAINE HYDROCHLORIDE IN DEXTROSE 1.5 ML: 7.5 INJECTION, SOLUTION SUBARACHNOID at 09:46

## 2019-06-20 RX ADMIN — ACETAMINOPHEN 1000 MG: 500 TABLET ORAL at 12:42

## 2019-06-20 RX ADMIN — CEFAZOLIN 2 G: 330 INJECTION, POWDER, FOR SOLUTION INTRAMUSCULAR; INTRAVENOUS at 09:48

## 2019-06-20 RX ADMIN — ACETAMINOPHEN 1000 MG: 500 TABLET ORAL at 20:06

## 2019-06-20 RX ADMIN — DIPHENHYDRAMINE HYDROCHLORIDE 12.5 MG: 50 INJECTION INTRAMUSCULAR; INTRAVENOUS at 17:33

## 2019-06-20 RX ADMIN — PHENYLEPHRINE HYDROCHLORIDE 100 MCG: 10 INJECTION INTRAVENOUS at 10:32

## 2019-06-20 RX ADMIN — PHENYLEPHRINE HYDROCHLORIDE 100 MCG: 10 INJECTION INTRAVENOUS at 10:14

## 2019-06-20 RX ADMIN — FENTANYL CITRATE 15 MCG: 50 INJECTION, SOLUTION INTRAMUSCULAR; INTRAVENOUS at 09:46

## 2019-06-20 RX ADMIN — SODIUM CHLORIDE, SODIUM GLUCONATE, SODIUM ACETATE, POTASSIUM CHLORIDE AND MAGNESIUM CHLORIDE 1500 ML: 526; 502; 368; 37; 30 INJECTION, SOLUTION INTRAVENOUS at 08:52

## 2019-06-20 RX ADMIN — PHENYLEPHRINE HYDROCHLORIDE 100 MCG: 10 INJECTION INTRAVENOUS at 10:36

## 2019-06-20 RX ADMIN — MORPHINE SULFATE 0.15 MG: 0.5 INJECTION, SOLUTION EPIDURAL; INTRATHECAL; INTRAVENOUS at 09:46

## 2019-06-20 RX ADMIN — KETOROLAC TROMETHAMINE 30 MG: 30 INJECTION, SOLUTION INTRAMUSCULAR at 12:42

## 2019-06-20 RX ADMIN — METOCLOPRAMIDE 10 MG: 5 INJECTION, SOLUTION INTRAMUSCULAR; INTRAVENOUS at 08:51

## 2019-06-20 RX ADMIN — OXYTOCIN: 10 INJECTION, SOLUTION INTRAMUSCULAR; INTRAVENOUS at 10:12

## 2019-06-20 RX ADMIN — FAMOTIDINE 20 MG: 10 INJECTION INTRAVENOUS at 08:50

## 2019-06-20 RX ADMIN — ONDANSETRON 4 MG: 2 INJECTION INTRAMUSCULAR; INTRAVENOUS at 14:09

## 2019-06-20 RX ADMIN — KETOROLAC TROMETHAMINE 30 MG: 30 INJECTION, SOLUTION INTRAMUSCULAR at 20:07

## 2019-06-20 RX ADMIN — PHENYLEPHRINE HYDROCHLORIDE 100 MCG: 10 INJECTION INTRAVENOUS at 10:22

## 2019-06-20 RX ADMIN — PHENYLEPHRINE HYDROCHLORIDE 100 MCG: 10 INJECTION INTRAVENOUS at 10:00

## 2019-06-20 RX ADMIN — SODIUM CITRATE AND CITRIC ACID MONOHYDRATE 30 ML: 500; 334 SOLUTION ORAL at 08:51

## 2019-06-20 RX ADMIN — SODIUM CHLORIDE, SODIUM GLUCONATE, SODIUM ACETATE, POTASSIUM CHLORIDE AND MAGNESIUM CHLORIDE: 526; 502; 368; 37; 30 INJECTION, SOLUTION INTRAVENOUS at 09:34

## 2019-06-20 ASSESSMENT — COPD QUESTIONNAIRES
HAVE YOU SMOKED AT LEAST 100 CIGARETTES IN YOUR ENTIRE LIFE: NO/DON'T KNOW
COPD SCREENING SCORE: 0
DURING THE PAST 4 WEEKS HOW MUCH DID YOU FEEL SHORT OF BREATH: NONE/LITTLE OF THE TIME
IN THE PAST 12 MONTHS DO YOU DO LESS THAN YOU USED TO BECAUSE OF YOUR BREATHING PROBLEMS: DISAGREE/UNSURE
DO YOU EVER COUGH UP ANY MUCUS OR PHLEGM?: NO/ONLY WITH OCCASIONAL COLDS OR INFECTIONS

## 2019-06-20 ASSESSMENT — PATIENT HEALTH QUESTIONNAIRE - PHQ9
SUM OF ALL RESPONSES TO PHQ9 QUESTIONS 1 AND 2: 0
1. LITTLE INTEREST OR PLEASURE IN DOING THINGS: NOT AT ALL
2. FEELING DOWN, DEPRESSED, IRRITABLE, OR HOPELESS: NOT AT ALL
1. LITTLE INTEREST OR PLEASURE IN DOING THINGS: NOT AT ALL
SUM OF ALL RESPONSES TO PHQ9 QUESTIONS 1 AND 2: 0

## 2019-06-20 ASSESSMENT — LIFESTYLE VARIABLES
EVER_SMOKED: NEVER
ALCOHOL_USE: NO

## 2019-06-20 ASSESSMENT — PAIN SCALES - GENERAL: PAIN_LEVEL: 2

## 2019-06-20 NOTE — L&D DELIVERY NOTE
OB  Delivery Note    2019  Claire Amato  39 y.o.    Review the Delivery Report for details.     Gestational Age: 36w4d  /Para:   Labor Complications:     Estimated Blood Loss:   IO Blood Loss  19 1003 - 19 1110    Est. Blood Loss Anesthesia 500 mL    Total  500 mL        Delivery Type: , Low Transverse   ROM to Delivery Time: 0h 00m   Sex: Male  [place] Weight: 3.25 kg (7 lb 2.6 oz)    1 Minute 5 Minute 10 Minute   Apgar Totals: 8    8             Details    Pre-Op Diagnosis: 1. Intrauterine pregnancy at 36w4d  2. Cholestasis of pregnancy   3. Hx of PPH, Desires Primary C/S   4. Desires BTL    Delivery Justification Patient was admitted to Labor and Delivery at 36w4d for primary  and Cholestasis    Post-Op Diagnosis: 1. Same    Indications:       Procedure: Primary  , Low Transverse  via Low Transverse  incision   Staff Surgeon(s):  Jose Grant M.D.    Assistant   Luisito Jeffries CNM   Circulator: Amisha Romero R.N.  Scrub Person: Mercy Carson  First Assist: Cristóbal Garnica C.N.M.  L&D Baby  Nurse: Lashay Mcguire R.N.   Anesthesia: Spinal    Findings: Male infant delivered, weighing 3.25 kg (7 lb 2.6 oz) . Normal uterus, tubes, and ovaries.     Informed Consent:  The risks, benefits, complications, and alternatives were discussed with the patient. The patient understood that the risks of  section include, but are not limited to: injury to nearby structures or organs, infection, blood loss and possible need for transfusion, and potential need for more surgery including hysterectomy. The patient stated understanding and desired to proceed. All questions were answered. The site of surgery was properly noted and marked. The patient was identified as Claire Amato and the procedure verified as a  delivery. A Time Out was held and the above information confirmed.    Procedure  Details:  After adequate Spinal  anesthesia was ascertained, the patient was prepped and draped in a normal supine position with a wedge under the right hip.  A pfannensteil incision was made.  The incision was taken down to the fascia, which was incised medial to lateral.  The rectus muscles were dissected off the rectus sheath.  There was separation of the rectus sheath superiorly and the peritoneum was entered atraumatically, extended superiorly and inferiorly.  An Helena O retractor was placed. The lower uterine segment was identified.  A low transverse incision was made in the uterus.  It was then extended digitally and the membranes were intact with clear fluid.  The infant was born in a vertex position.  It was a    Male infant.  Placenta removal: Manual Removal . Placental disposition: discarded . The uterus was repaired in situ with an helena-o retractor, cleansed of all clots and membranes.  The lower segment was manually dilated for lochia egress.  Attention was made towards closing the uterus in a 2-layer fashion with #1 Chromic suture ligature in a running interlocking stitch with hemostatis assured.  The gutters were cleansed of all clots.  Tubal ligation was performed using Salpingectomy with ligasure .  Normal tubes and ovaries were noted, and multiple sheets of Interceed were placed in the peritoneal cavity to prevent additional adhesion formation.  The fascia was closed with 0 Vicryl going right to left, left to right, crossing in the midline with a running interlocking stitch.  The subcutaneous tissues was copiously irrigated.  Small capillary bleeders individually coagulated.  The subcutaneous tissues were approximated with interrupted 3-0 Vicryl and the skin with subcuticular suture with 4-0 Vicryl.   Steri Strip/ Mypilex/ tegaderm     The patient tolerated the procedure well. Sponge, instrument, and needle counts were correct and the patient was taken to the recovery room in good and stable  condition.    Jose Grant

## 2019-06-20 NOTE — ANESTHESIA TIME REPORT
Anesthesia Start and Stop Event Times     Date Time Event    2019 0934 Anesthesia Start     1106 Anesthesia Stop        Responsible Staff  19    Name Role Begin End    Guzman Martinez D.O. Anesth 0934 1106        Preop Diagnosis (Free Text):  Pre-op Diagnosis     CHOLESTASIS DURING PREGNANCY IN THIRD TRIMESTER, 36.4 WEEKS        Preop Diagnosis (Codes):  Diagnosis Information     Diagnosis Code(s):  delivery delivered [O82]        Post op Diagnosis  Delivery by elective  section      Premium Reason  Non-Premium    Comments:

## 2019-06-20 NOTE — OR SURGEON
Immediate Post OP Note    PreOp Diagnosis: 36.4 week IUP                                 Cholestasis                                 Hx of PPH  Desires Primary C/S , and BTL     PostOp Diagnosis: 36.4 week IUP                                 Cholestasis                                 Hx of PPH  Desires Primary C/S , and BTL       Procedure(s):   SECTION, REPEAT, WITH SALPINGECTOMY - Wound Class: Clean Contaminated    Surgeon(s):  Jose Grant M.D.    Anesthesiologist/Type of Anesthesia:  Anesthesiologist: Guzman Martinez D.O./Spinal    Surgical Staff:  Circulator: Amisha Romero R.N.  Scrub Person: Mercy Carson  First Assist: Cristóbal Garnica C.N.M.  L&D Baby  Nurse: Lashay Mcguire R.N.    Specimens removed if any:  * No specimens in log *    Estimated Blood Loss: 650 cc    Findings: viable male , apgar 8/8     Complications: none        2019 11:07 AM Jose Grant M.D.

## 2019-06-20 NOTE — H&P
History and Physical    Claire Amato is a 39 y.o. female  -Para:     Gestational Age:  36w4d  Admitted for:   Primary c/s for cholestasis  Admitted to  [facility].  [facility]    HPI: Patient is admitted with the above mentioned Chief Complaint and States   Loss of fluid:   negative  Abdominal Pain:  negative  Uterine Contractions:  negative  Vaginal Bleeding:  negative  Fetal Movement:  normal  Patient denies fever, chills, nausea, vomiting , headache, visual disturbance, or dysuria. Last PO intake 10 hours ago.  Patient's last menstrual period was 10/07/2018.  Estimated Date of Delivery: 19  Final SALVATORE: 2019, by Last Menstrual Period    Patient has history of postpartum hemorrhage x2 and has continued to affirm her desire for elective c/s with BTL with or without medical indication. She was counseled extensively during her prenatal care regarding postpartum hemorrhage not being an indication for  delivery. At current, indication for delivery is cholestasis of pregnancy >36 weeks of pregnancy and patient wishes to proceed with  delivery route.     Patient Active Problem List    Diagnosis Date Noted   • Cholestasis during pregnancy in third trimester 2019   • History of anxiety 2019   • Supervision of other high risk pregnancies, third trimester 2019   • Elderly multigravida in third trimester 2019   • History of PPHx 2 - desires primary C/S and BTL (requested) 2018   • HTN (hypertension)-late last pregnancy without meds.  10/28/2010   • Varicose vein of leg 04/15/2010       Admitting DX: Pregnancy  Pregnancy Complications:  Cholestasis of pregnancy  OB Risk Factors:   advanced maternal age  Labor State:    Not in labor    History:   has a past medical history of Varicose vein of leg (4/15/2010).     has no past surgical history on file.    OB History    Para Term  AB  "Living   5 4 3 1   4   SAB TAB Ectopic Molar Multiple Live Births             4      # Outcome Date GA Lbr Alejandro/2nd Weight Sex Delivery Anes PTL Lv   5 Current            4 Term 11/03/10 39w0d  3.884 kg (8 lb 9 oz) M Vag-Spont None N NARDA      Complications: Postpartum hemorrhage      Birth Comments: HEMORRAGE, BREASTFEEDING    3 Term 08 40w0d 02:00 3.43 kg (7 lb 9 oz) M Vag-Spont None Y NARDA      Complications: Postpartum hemorrhage      Birth Comments: HTN at 38 weeks- no meds per pt. .    2  04 36w0d 00:30 3.345 kg (7 lb 6 oz) M Vag-Spont None N NARDA      Birth Comments: SROM 36weeks, baby stayed in ICU x 1 week   1 Term 03 40w0d 08:00 3.402 kg (7 lb 8 oz) M SPONTANEOUS EPI  NARDA          Medications:  No current facility-administered medications on file prior to encounter.      Current Outpatient Prescriptions on File Prior to Encounter   Medication Sig Dispense Refill   • ursodiol (ACTIGALL) 300 MG Cap Take 1 Cap by mouth 2 times a day. 60 Cap 3   • Prenatal Vit w/Ih-Vufxwfpxi-CF (PNV PO) Take  by mouth.         Allergies:  Nkda [no known drug allergy]    ROS:   Neuro: negative    Cardiovascular: negative  Gastro intestinal: negative  Genitourinary: negative            Physical Exam:  /65   Pulse 60   Ht 1.626 m (5' 4.02\")   Wt 96.2 kg (212 lb)   LMP 10/07/2018   BMI 36.37 kg/m²   Constitutional: healthy-appearing, Well-developed, well-nourished  HEENT: PERRLA and EOMI  Breast Exam: negative  Cardio: regular rate and rhythm, S1, S2 normal, no murmur, click, rub or gallop  Lung: unlabored respirations, no intercostal retractions or accessory muscle use  Abdomen: abdomen is soft without significant tenderness, masses, organomegaly or guarding  Extremity: extremities, peripheral pulses and reflexes normal. Multiple varicosities throughout bilateral lower extremities with ecchymosis.     Cervical Exam: deferred  Fetal Assessment: Fetal heart variability: moderate  Fetal Heart Rate " accelerations: yes  Baseline FHR: 140 bpm   Estimated Fetal Weight: 3000 - 3500g      Labs:  Recent Labs      06/18/19   1326  06/20/19   0755   WBC  7.4  8.2   RBC  4.58  4.82   HEMOGLOBIN  13.5  14.1   HEMATOCRIT  40.2  42.9   MCV  87.8  89.0   MCH  29.5  29.3   MCHC  33.6  32.9*   RDW  40.8  41.8   PLATELETCT  195  222   MPV  12.5  13.2*     Prenatal Results     General (Most Recent Result)     Test Value Date Time    ABO B  02/11/19 1018    Rh POS  02/11/19 1018    Antibody screen NEG  02/11/19 1018    HbA1c       Gonorrhea Negative  01/16/19 1609    Chlamydia  by PCR Negative  01/16/19 1609    Chlamydia by DNA Negative  04/15/10     RPR/Syphilus Non Reactive  04/23/19 0812    HSV 1/2 by PCR (non-serum)       HSV 1/2 (serum)       HSV 1       HSV 2       HPV (16)       HPV (18)       HPV (other)       HBsAg Negative  02/11/19 1018    HIV-1 HIV-2 Antibodies Non Reactive  02/11/19 1018    Rubella 196.60 IU/mL 02/11/19 1018    Tb             Pap Smear (Most Recent Result)     Test Value Date Time    Pap smear       Pap smear w/HPV       Pap smear w/CTNG       Pap smar w/HPV CTNG       Pap smear (reflex HPV ACUS)       Pap smear (reflex HPV ASCUS w/CTNG)  (See Report)   01/16/19 1609    Pathology gyn specimen  (See Report)   01/16/19 1609          Urinalysis (Most Recent Result)     Test Value Date Time    Urinalysis  Abnormal  (A)   (See Report)   06/18/19 1320    Urinalysis (culture if indicated)       POC urinalysis  (See Report)   01/16/19 1500    Urine drug screen       Urine drug screen (w/o conf)       Urine culture ([id number])       Urine culture ([id number])             1st Trimester     Test Value Date Time    Hgb       Hct       Fasting Glucose Tolerance       GTT, 1 hour       GTT, 2 hours       GTT, 3 hours             2nd Trimester     Test Value Date Time    Hgb 13.1 g/dL 02/11/19 1018    Hct 38.9 % 02/11/19 1018    Urinalysis       Urine Culture       AST       ALT       Uric Acid       Fasting  Glucose Tolerance       GTT, 1 hour       GTT, 2 hours       GTT, 3 hours       Urine Protein/Creatinine Ratio             3rd Trimester     Test Value Date Time    Hgb 14.1 g/dL 19 0755    Hct 42.9 % 19 0755    Platelet count 222 K/uL 19 0755    GBS (JOHNSON BROTH) Negative  19 0909    GBS (Direct) Negative  19 0909    Urinalysis       Urine Culture       Urine Drug Screen       Urine Protein/Creatinine Ratio  Abnormal  (A)   (See Report)   19 1320          Congenital Disease Screening     Test Value Date Time    First Trimester Screen       Quad Screen       Sickle Cell       Thalassemia       [facility]       Cystic Fibrosis Carrier Study                     Assessment:  Gestational Age:  36w4d  Labor State:   Not in labor  Risk Factors:   advanced maternal age  Pregnancy Complications: cholestasis of pregnancy    Patient Active Problem List    Diagnosis Date Noted   • Cholestasis during pregnancy in third trimester 2019   • History of anxiety 2019   • Supervision of other high risk pregnancies, third trimester 2019   • Elderly multigravida in third trimester 2019   • History of PPHx 2 - desires primary C/S and BTL (requested) 2018   • HTN (hypertension)-late last pregnancy without meds.  10/28/2010   • Varicose vein of leg 04/15/2010       Plan:   Admitted for: Primary  cholestasis  PCS  CBC, CMP, type cross and hold 2 units  routine urinalysis  Antibiotics: on call for OR    Patient is aware that there is increased risk of bleeding associated with  delivery in light of history of likely lower uterine atony. She was counseled today on increased risk of postpartum hemorrhage, hysterectomy, and blood transfusion due to her history but also inherent risks associated with  delivery. She does express desire for blood products if indicated.  She declines trial of labor at this time and wishes to proceed with  delivery.      Cristóbal Garnica C.N.M.

## 2019-06-20 NOTE — PROGRESS NOTES
TRANSFERRED FROM L&D TO POSTPARTUM VIA GUERNEY. TRANSFERRED TO BED. ASSESSMENT DONE. LOCHIA light rubra. PAIN 4 of10.iV PATENT right arm without redness or swelling. HARDEN TO DD. SEQUENTIALS ON. ORIENTED TO UNIT PROCEDURES AND INFANT SAFETY. ENC. TO CALL WITH NEEDS.

## 2019-06-20 NOTE — ANESTHESIA PROCEDURE NOTES
Spinal Block  Performed by: RAMESH HERNANDES  Authorized by: RAMESH HERNANDES     Patient Location:  OR  Start Time:  6/20/2019 9:36 AM  End Time:  6/20/2019 9:46 AM  Reason for Block: primary anesthetic    patient identified, IV checked, site marked, risks and benefits discussed, surgical consent, monitors and equipment checked, pre-op evaluation and timeout performed    Patient Position:  Sitting  Prep: ChloraPrep, patient draped and sterile technique    Monitoring:  Blood pressure, continuous pulse oximetry and heart rate  Approach:  Midline  Location:  L2-3  Injection Technique:  Single-shot  Skin infiltration:  Lidocaine  Strength:  1%  Dose:  3ml  Needle Type:  Pencan  Needle Gauge:  25 G  CSF flowing pre/post injection:  Yes  Sensory Level:  T4

## 2019-06-20 NOTE — ANESTHESIA PREPROCEDURE EVALUATION
Relevant Problems   (+) HTN (hypertension)-late last pregnancy without meds.    (+) History of anxiety      @ 36w4d, IUP, [facility]  Hx postpartum hemorrhage x 2  Cholestasis with pregnancy  Physical Exam    Airway   Mallampati: II  TM distance: >3 FB  Neck ROM: full       Cardiovascular - normal exam  Rhythm: regular  Rate: normal  (-) murmur     Dental - normal exam         Pulmonary - normal exam  Breath sounds clear to auscultation     Abdominal    Neurological - normal exam                 Anesthesia Plan    ASA 2       Plan - spinal   Neuraxial block will be primary anesthetic            Postoperative Plan: Postoperative administration of opioids is intended.    Pertinent diagnostic labs and testing reviewed    Informed Consent:    Anesthetic plan and risks discussed with patient.

## 2019-06-20 NOTE — ANESTHESIA QCDR
2019 [facility] Clinical Data Registry (for Quality Improvement)     Postoperative nausea/vomiting risk protocol (Adult = 18 yrs and Pediatric 3-17 yrs)- (430 and 463)  General inhalation anesthetic (NOT TIVA) with PONV risk factors: No  Provision of anti-emetic therapy with at least 2 different classes of agents: N/A  Patient DID NOT receive anti-emetic therapy and reason is documented in Medical Record: N/A    Multimodal Pain Management- (AQI59)  Patient undergoing Elective Surgery (i.e. Outpatient, or ASC, or Prescheduled Surgery prior to Hospital Admission): No  Use of Multimodal Pain Management, two or more drugs and/or interventions, NOT including systemic opioids: N/A  Exception: Documented allergy to multiple classes of analgesics: N/A    PACU assessment of acute postoperative pain prior to Anesthesia Care End- Applies to Patients Age = 18- (ABG7)  Initial PACU pain score is which of the following: < 7/10  Patient unable to report pain score: N/A    Post-anesthetic transfer of care checklist/protocol to PACU/ICU- (426 and 427)  Upon conclusion of case, patient transferred to which of the following locations: PACU/Non-ICU  Use of transfer checklist/protocol: Yes  Exclusion: Service Performed in Patient Hospital Room (and thus did not require transfer): N/A    PACU Reintubation- (AQI31)  General anesthesia requiring endotracheal intubation (ETT) along with subsequent extubation in OR or PACU: No  Required reintubation in the PACU: N/A  Extubation was a planned trial documented in the medical record prior to removal of the original airway device: N/A    Unplanned admission to ICU related to anesthesia service up through end of PACU care- (MD51)  Unplanned admission to ICU (not initially anticipated at anesthesia start time): No

## 2019-06-20 NOTE — PROGRESS NOTES
Requests to have sequentials off. Given reasons for the sequentials. Pt. States she will , at regular intervals.move legs and feet to keep good circulation

## 2019-06-21 PROCEDURE — 90715 TDAP VACCINE 7 YRS/> IM: CPT

## 2019-06-21 PROCEDURE — 770002 HCHG ROOM/CARE - OB PRIVATE (112)

## 2019-06-21 PROCEDURE — 3E0234Z INTRODUCTION OF SERUM, TOXOID AND VACCINE INTO MUSCLE, PERCUTANEOUS APPROACH: ICD-10-PCS | Performed by: OBSTETRICS & GYNECOLOGY

## 2019-06-21 PROCEDURE — 700102 HCHG RX REV CODE 250 W/ 637 OVERRIDE(OP): Performed by: OBSTETRICS & GYNECOLOGY

## 2019-06-21 PROCEDURE — A9270 NON-COVERED ITEM OR SERVICE: HCPCS | Performed by: ANESTHESIOLOGY

## 2019-06-21 PROCEDURE — 700102 HCHG RX REV CODE 250 W/ 637 OVERRIDE(OP): Performed by: ANESTHESIOLOGY

## 2019-06-21 PROCEDURE — 700111 HCHG RX REV CODE 636 W/ 250 OVERRIDE (IP): Performed by: ANESTHESIOLOGY

## 2019-06-21 PROCEDURE — A9270 NON-COVERED ITEM OR SERVICE: HCPCS | Performed by: OBSTETRICS & GYNECOLOGY

## 2019-06-21 PROCEDURE — 90471 IMMUNIZATION ADMIN: CPT

## 2019-06-21 PROCEDURE — 700111 HCHG RX REV CODE 636 W/ 250 OVERRIDE (IP)

## 2019-06-21 RX ORDER — OXYCODONE HYDROCHLORIDE AND ACETAMINOPHEN 5; 325 MG/1; MG/1
1 TABLET ORAL EVERY 4 HOURS PRN
Status: DISCONTINUED | OUTPATIENT
Start: 2019-06-21 | End: 2019-06-23 | Stop reason: HOSPADM

## 2019-06-21 RX ORDER — ACETAMINOPHEN 325 MG/1
650 TABLET ORAL EVERY 4 HOURS PRN
Status: DISCONTINUED | OUTPATIENT
Start: 2019-06-21 | End: 2019-06-23 | Stop reason: HOSPADM

## 2019-06-21 RX ORDER — IBUPROFEN 800 MG/1
800 TABLET ORAL EVERY 8 HOURS PRN
Status: DISCONTINUED | OUTPATIENT
Start: 2019-06-21 | End: 2019-06-23 | Stop reason: HOSPADM

## 2019-06-21 RX ORDER — OXYCODONE AND ACETAMINOPHEN 10; 325 MG/1; MG/1
1 TABLET ORAL EVERY 4 HOURS PRN
Status: DISCONTINUED | OUTPATIENT
Start: 2019-06-21 | End: 2019-06-23 | Stop reason: HOSPADM

## 2019-06-21 RX ORDER — MORPHINE SULFATE 4 MG/ML
4 INJECTION, SOLUTION INTRAMUSCULAR; INTRAVENOUS
Status: DISCONTINUED | OUTPATIENT
Start: 2019-06-21 | End: 2019-06-23 | Stop reason: HOSPADM

## 2019-06-21 RX ADMIN — IBUPROFEN 800 MG: 800 TABLET ORAL at 16:08

## 2019-06-21 RX ADMIN — KETOROLAC TROMETHAMINE 30 MG: 30 INJECTION, SOLUTION INTRAMUSCULAR at 09:51

## 2019-06-21 RX ADMIN — ACETAMINOPHEN 1000 MG: 500 TABLET ORAL at 09:51

## 2019-06-21 RX ADMIN — ACETAMINOPHEN 1000 MG: 500 TABLET ORAL at 04:05

## 2019-06-21 RX ADMIN — KETOROLAC TROMETHAMINE 30 MG: 30 INJECTION, SOLUTION INTRAMUSCULAR at 04:06

## 2019-06-21 RX ADMIN — TETANUS TOXOID, REDUCED DIPHTHERIA TOXOID AND ACELLULAR PERTUSSIS VACCINE, ADSORBED 0.5 ML: 5; 2.5; 8; 8; 2.5 SUSPENSION INTRAMUSCULAR at 05:52

## 2019-06-21 ASSESSMENT — EDINBURGH POSTNATAL DEPRESSION SCALE (EPDS)
I HAVE BEEN SO UNHAPPY THAT I HAVE HAD DIFFICULTY SLEEPING: NOT AT ALL
I HAVE BEEN ANXIOUS OR WORRIED FOR NO GOOD REASON: HARDLY EVER
I HAVE FELT SAD OR MISERABLE: NO, NOT AT ALL
I HAVE BLAMED MYSELF UNNECESSARILY WHEN THINGS WENT WRONG: NO, NEVER
THE THOUGHT OF HARMING MYSELF HAS OCCURRED TO ME: NEVER
I HAVE LOOKED FORWARD WITH ENJOYMENT TO THINGS: AS MUCH AS I EVER DID
I HAVE FELT SCARED OR PANICKY FOR NO GOOD REASON: NO, NOT MUCH
THINGS HAVE BEEN GETTING ON TOP OF ME: NO, MOST OF THE TIME I HAVE COPED QUITE WELL
I HAVE BEEN SO UNHAPPY THAT I HAVE BEEN CRYING: NO, NEVER
I HAVE BEEN ABLE TO LAUGH AND SEE THE FUNNY SIDE OF THINGS: AS MUCH AS I ALWAYS COULD

## 2019-06-21 NOTE — DISCHARGE PLANNING
Discharge Planning Assessment Post Partum    Reason for Referral: Hx of anxiety, FOB requested LSW provide support in him informing MOB of nephew's recent death.   Address: [place] [address]  Type of Living Situation: House with FOB and other children  Mom Diagnosis: Pregnancy  Baby Diagnosis: [place]er  Primary Language: Welsh; However, MOB/FOB both speak English    Name of Baby: Renato Garcia  Father of the Baby: Vladimir Barber  Involved in baby’s care? Yes  Contact Information: [phone]    Prenatal Care: Yes  Mom's PCP: Meli Ledesma  PCP for new baby: [facility]    Support System: Yes  Coping/Bonding between mother & baby: Yes  Source of Feeding: Breast  Supplies for Infant: Prepared    Mom's Insurance: Access to Healthcare  Baby Covered on Insurance: MOB has already added baby to her insurance  Mother Employed/School: No, FOB works  Other children in the home/names & ages: MOB also has 4 other children (16, 15, 11, 8)    Financial Hardship/Income: No  Mom's Mental status: Alert and Oriented x 4  Services used prior to admit: Medicaid, WIC    CPS History: No  Psychiatric History: MOB stated she started feeling depressed about 3 years ago. MOB stated she is doing well right now and will reach out if she is experiencing any symptoms or signs of depression.   Domestic Violence History: No  Drug/ETOH History: No    Resources Provided: Children and Family Resource List  Referrals Made: Diaper referral    LSW provided support while FOB informed MOB that their nephew has passed away. MOB and FOB were tearful.     LSW spoke with MOB 30 minutes later to check in. MOB stated she is doing well and has no further needs or questions at this time.      Clearance for Discharge: Baby is clear to discharge home with MOB/FOB upon medical clearance.     Ongoing Plan: No further social work needs at this time.

## 2019-06-21 NOTE — PROGRESS NOTES
Assessment complete. [place] in place with active order. Pt states pain is 2/10 and will call for PRN pain meds as needed. POC discussed with pt and family, questions answered, call light within reach.

## 2019-06-21 NOTE — ANESTHESIA POSTPROCEDURE EVALUATION
Patient: Claire Amato    Procedure Summary     Date:  19 Room / Location:  [facility] OR  / LABOR AND DELIVERY    Anesthesia Start:  934 Anesthesia Stop:      Procedure:   SECTION, REPEAT, WITH SALPINGECTOMY (N/A ) Diagnosis:        delivery delivered      ( section delivered)    Surgeon:  Jose Grant M.D. Responsible Provider:  Guzman Martinez D.O.    Anesthesia Type:  spinal ASA Status:  2          Final Anesthesia Type: spinal  Last vitals  BP   Blood Pressure: 104/69, NIBP: 106/67    Temp   36.5 °C (97.7 °F)    Pulse   Pulse: 81   Resp   20    SpO2   98 %      Anesthesia Post Evaluation    Patient location during evaluation: PACU  Patient participation: complete - patient participated  Level of consciousness: awake and alert  Pain score: 2    Airway patency: patent  Anesthetic complications: no  Cardiovascular status: hemodynamically stable  Respiratory status: acceptable  Hydration status: euvolemic    PONV: none           Nurse Pain Score: 3 (NPRS)

## 2019-06-21 NOTE — PROGRESS NOTES
Obstetrics & Gynecology Post-Delivery Progress Note    Date of Service      39 y.o.  s/p  for cholestasis and refusal for    Delivery date: 19  Delivery at 36w4d  Hx of PPH x 2   Anxiety     Events  No events    Subjective  Pain: No  Bleeding: lochia minimal  PO's: taking clears, is hungry for breakfast   Voiding: without difficulty  Ambulating: yes  Passing flatus: Yes  Feeding: breastfeeding -  2 other infants for 8 months    Objective  Temp:  [35.5 °C (95.9 °F)-36.5 °C (97.7 °F)] 36.3 °C (97.3 °F)  Pulse:  [60-87] 75  Resp:  [16-20] 18  BP: ()/(52-76) 115/68  SpO2:  [93 %-100 %] 96 %    Physical Exam  General: well  Chest/Breasts: nipples intact and breasts soft   Abdomen: nontender, normal bowel sounds, soft, non-distended  Fundus: firm and below umbilicus  Incision: dressing clean, dry, intact  Perineum: well approximated  Extremities: symmetric, calves nontender    Recent Labs      19   1326  19   0755  19   WBC  7.4  8.2  13.2*   RBC  4.58  4.82  3.61*   HEMOGLOBIN  13.5  14.1  10.2*   HEMATOCRIT  40.2  42.9  31.6*   MCV  87.8  89.0  89.2   MCH  29.5  29.3  28.0   RDW  40.8  41.8  42.0   PLATELETCT  195  222  167   MPV  12.5  13.2*  12.5   NEUTSPOLYS  62.80  61.30   --    LYMPHOCYTES  29.40  29.30   --    MONOCYTES  5.80  6.70   --    EOSINOPHILS  1.20  1.70   --    BASOPHILS  0.40  0.60   --        Assessment/Plan  Claire Amato is a 39 y.o.yo  s/p postop day #1  s/p  for cholestasis and refusal of  due to anxiety about PPHx2.     1. Post care: meeting all goals  2. Hemodynamics: stable  3. Pain: controlled  4. Rh+, Rubella Immune  5. Method of Feeding: plans to breastfeed  6. Method of Contraception: tubal ligation (already done)  7. Disposition: likely home postop day 3    VTE prophylaxis: none indicated, ambulating

## 2019-06-21 NOTE — LACTATION NOTE
Lactation note:    Follow up visit. Infant weight down 4.3% at around 22 hours of age. Discussed breastfeeding the LPI, and what to watch out for during feedings. Reviewed Your LPI Baby handout.     Discussed feeding plan-   1- To breastfeed first.  2- if latch or breastfeeding is suboptimal, can supplement according to [place] guidelines.    MOB has no other questions or concerns regarding breastfeeding. Encouraged to call for assistance as needed.

## 2019-06-21 NOTE — PROGRESS NOTES
No more c/o nausea. Up to br for gabriel care. No dizziness. Ambulated with steady gait and slight assist from rn

## 2019-06-21 NOTE — CARE PLAN
Problem: Communication  Goal: The ability to communicate needs accurately and effectively will improve  Outcome: PROGRESSING AS EXPECTED  Patient able to communicate wants and needs and states understanding of call light use.    Problem: Potential knowledge deficit related to lack of understanding of self and  care  Goal: Patient will verbalize understanding of self and infant care  Outcome: PROGRESSING AS EXPECTED  POC discussed with pt, questions answered, verbalized understanding.

## 2019-06-21 NOTE — PROGRESS NOTES
Pt ambulated in hallway. Tolerated well. Pt back to room and sitting in chair after walk and states she felt dizzy. Pt assisted back to bed and offered juice. Vss. Pt states she no longer feels dizzy and feels she took to long of a walk.  Pt instructed to call before getting out of bed. Call bell in reach.

## 2019-06-21 NOTE — LACTATION NOTE
MOB holding infant. Reports that she has  3 x since delivery and latching well. History of breastfeeding last two babies for 8 months without difficulty. Denies need for assistance @this time. Encouraged to call if lactaion support needed and for assessment of latch when infant feeding . MOB reports understanding.

## 2019-06-22 PROCEDURE — A9270 NON-COVERED ITEM OR SERVICE: HCPCS | Performed by: OBSTETRICS & GYNECOLOGY

## 2019-06-22 PROCEDURE — 770002 HCHG ROOM/CARE - OB PRIVATE (112)

## 2019-06-22 PROCEDURE — 700112 HCHG RX REV CODE 229: Performed by: OBSTETRICS & GYNECOLOGY

## 2019-06-22 PROCEDURE — 700102 HCHG RX REV CODE 250 W/ 637 OVERRIDE(OP): Performed by: OBSTETRICS & GYNECOLOGY

## 2019-06-22 RX ADMIN — IBUPROFEN 800 MG: 800 TABLET ORAL at 09:14

## 2019-06-22 RX ADMIN — OXYCODONE HYDROCHLORIDE AND ACETAMINOPHEN 1 TABLET: 5; 325 TABLET ORAL at 16:13

## 2019-06-22 RX ADMIN — OXYCODONE HYDROCHLORIDE AND ACETAMINOPHEN 1 TABLET: 10; 325 TABLET ORAL at 01:24

## 2019-06-22 RX ADMIN — IBUPROFEN 800 MG: 800 TABLET ORAL at 01:24

## 2019-06-22 RX ADMIN — DOCUSATE SODIUM 100 MG: 100 CAPSULE, LIQUID FILLED ORAL at 01:25

## 2019-06-22 RX ADMIN — IBUPROFEN 800 MG: 800 TABLET ORAL at 21:58

## 2019-06-22 RX ADMIN — DOCUSATE SODIUM 100 MG: 100 CAPSULE, LIQUID FILLED ORAL at 21:59

## 2019-06-22 ASSESSMENT — EDINBURGH POSTNATAL DEPRESSION SCALE (EPDS)
I HAVE BEEN SO UNHAPPY THAT I HAVE HAD DIFFICULTY SLEEPING: NOT AT ALL
I HAVE FELT SAD OR MISERABLE: NO, NOT AT ALL
I HAVE BEEN SO UNHAPPY THAT I HAVE BEEN CRYING: NO, NEVER
I HAVE BEEN ANXIOUS OR WORRIED FOR NO GOOD REASON: NO, NOT AT ALL
I HAVE FELT SCARED OR PANICKY FOR NO GOOD REASON: NO, NOT AT ALL
I HAVE BLAMED MYSELF UNNECESSARILY WHEN THINGS WENT WRONG: NO, NEVER
THE THOUGHT OF HARMING MYSELF HAS OCCURRED TO ME: NEVER
I HAVE BEEN ABLE TO LAUGH AND SEE THE FUNNY SIDE OF THINGS: AS MUCH AS I ALWAYS COULD
THINGS HAVE BEEN GETTING ON TOP OF ME: NO, I HAVE BEEN COPING AS WELL AS EVER
I HAVE LOOKED FORWARD WITH ENJOYMENT TO THINGS: AS MUCH AS I EVER DID

## 2019-06-22 NOTE — PROGRESS NOTES
Obstetrics & Gynecology Post-Delivery Progress Note    Date of Service    39 y.o.  s/p  for cholestasis and refusal for    Delivery date: 19  Delivery at 36w4d  Hx of PPH x 2   Anxiety     Events  No events    Subjective  Pain: No  Bleeding: lochia minimal  PO's: taking regular diet  Voiding: without difficulty  Ambulating: yes  Passing flatus: Yes  Feeding: breastfeeding well    Objective  Temp:  [36.3 °C (97.4 °F)-36.4 °C (97.6 °F)] 36.4 °C (97.5 °F)  Pulse:  [71-90] 71  Resp:  [16-18] 18  BP: (106-117)/(61-67) 106/61  SpO2:  [96 %-97 %] 97 %    Physical Exam  General: well  Chest/Breasts: nipples intact and breasts soft   Abdomen: nontender, soft, non-distended  Fundus: firm and below umbilicus  Incision: not applicable, (vaginal delivery)  Perineum: well approximated and well healing  Extremities: symmetric, calves nontender    Recent Labs      19   0755  19   WBC  8.2  13.2*   RBC  4.82  3.61*   HEMOGLOBIN  14.1  10.2*   HEMATOCRIT  42.9  31.6*   MCV  89.0  89.2   MCH  29.3  28.0   RDW  41.8  42.0   PLATELETCT  222  167   MPV  13.2*  12.5   NEUTSPOLYS  61.30   --    LYMPHOCYTES  29.30   --    MONOCYTES  6.70   --    EOSINOPHILS  1.70   --    BASOPHILS  0.60   --        Assessment/Plan  Claire Amato is a 39 y.o.yo  s/p postop day #2  s/p  for cholestasis and refusal of     1. Post care: meeting all goals  2. Hemodynamics: stable  3. Pain: controlled  4. Rh+, Rubella Immune  5. Method of Feeding: plans to breastfeed  6. Method of Contraception: tubal ligation (already done)  7. Disposition: likely home postop day 3    VTE prophylaxis: none indicated, ambulating    OB Attending:    I have discussed Ms. Claire Amato w/ Dr. Guzman and agree w/ documented plan of care by them.  Pt is a 39 y.o.  s/p elective primary c/s desiring discharge today POD 2 and meeting postop milestones.       Hilary Schmitz MD  [facility]  [facility], Women's Health

## 2019-06-22 NOTE — CARE PLAN
Problem: Altered physiologic condition related to postoperative  delivery  Goal: Patient physiologically stable as evidenced by normal lochia, palpable uterine involution and vital signs within normal limits  Outcome: PROGRESSING AS EXPECTED  Fundal massaged done     Problem: Alteration in comfort related to surgical incision and/or after birth pains  Goal: Patient is able to ambulate, care for self and infant with acceptable pain level    Intervention: Assess 0-10 pain level with vital signs  Pain controlled

## 2019-06-22 NOTE — LACTATION NOTE
"This note was copied from a baby's chart.  Mother states baby breastfeeds well, states she is also supplementing with formula because she \"has no milk\", discussed milk supply initiation and expected volumes, discussed infant's gestation and LPI policy, discussed potential for inadequate milk transfer when breastfeeding the LPI and importance of supplementation due to infant's gestation and weight loss, supplement guidelines (goldenrod sheet) at bedside and mother states understanding of appropriate supplement volumes to provide, mother was signed up with [facility] (in [place] at  Office on [address]) yesterday while hospitalized, discussed assistance available at [facility] after discharge    Plan:  Q 3 hours breastfeed  Supplement with formula (mother does not wish to pump) per goldenrod sheet    Call for assistance as needed  "

## 2019-06-22 NOTE — PROGRESS NOTES
1920 Pt doing well bonding with baby, Assessment done wound covered with Mepilex Silver clean and dry, Pt denies pain at this time, Ambulated well, Needs attended.

## 2019-06-23 VITALS
HEART RATE: 68 BPM | BODY MASS INDEX: 36.19 KG/M2 | OXYGEN SATURATION: 96 % | SYSTOLIC BLOOD PRESSURE: 97 MMHG | RESPIRATION RATE: 18 BRPM | DIASTOLIC BLOOD PRESSURE: 63 MMHG | HEIGHT: 64 IN | WEIGHT: 212 LBS | TEMPERATURE: 97.1 F

## 2019-06-23 PROCEDURE — A9270 NON-COVERED ITEM OR SERVICE: HCPCS | Performed by: OBSTETRICS & GYNECOLOGY

## 2019-06-23 PROCEDURE — 700102 HCHG RX REV CODE 250 W/ 637 OVERRIDE(OP): Performed by: OBSTETRICS & GYNECOLOGY

## 2019-06-23 RX ORDER — LANOLIN ALCOHOL/MO/W.PET/CERES
325 CREAM (GRAM) TOPICAL
Qty: 90 TAB | Refills: 3 | Status: SHIPPED | OUTPATIENT
Start: 2019-06-23

## 2019-06-23 RX ORDER — DOCUSATE SODIUM 100 MG/1
100 CAPSULE, LIQUID FILLED ORAL 2 TIMES DAILY
Qty: 60 CAP | Refills: 3 | Status: SHIPPED | OUTPATIENT
Start: 2019-06-23

## 2019-06-23 RX ORDER — IBUPROFEN 600 MG/1
600 TABLET ORAL EVERY 6 HOURS PRN
Qty: 30 TAB | Refills: 3 | Status: SHIPPED | OUTPATIENT
Start: 2019-06-23

## 2019-06-23 RX ORDER — OXYCODONE HYDROCHLORIDE AND ACETAMINOPHEN 5; 325 MG/1; MG/1
1 TABLET ORAL EVERY 6 HOURS PRN
Qty: 30 TAB | Refills: 0 | Status: SHIPPED | OUTPATIENT
Start: 2019-06-23 | End: 2019-06-30

## 2019-06-23 RX ADMIN — OXYCODONE HYDROCHLORIDE AND ACETAMINOPHEN 1 TABLET: 5; 325 TABLET ORAL at 03:27

## 2019-06-23 NOTE — LACTATION NOTE
This note was copied from a baby's chart.  Baby 36.4 weeks, LPI, , couplet to be discharged today. Mother has been breastfeeding & supplementing with formula according to guideline volumes, every 2-3 hours. Discussed with mother pumping breasts to protect milk supply, mother plans to F/U with [facility] for outpatient lactation support. Hand pump ordered to protect mother's milk supply & per mother's request, informed mother to pump after breastfeeding baby to empty breasts. Teaching on feeding back pumped BM.    Breastfeeding POC:  Breastfeed then supplement according to guidelines every 2-3 hours, may pump to protect milk supply.

## 2019-06-23 NOTE — CARE PLAN
Problem: Communication  Goal: The ability to communicate needs accurately and effectively will improve  Pt educated on use of call light and white board for communication, pt verbalizes understanding of white board communication and times of rounding.     Problem: Pain Management  Goal: Pain level will decrease to patient's comfort goal  Pain assessed Q2h. Pain medication given per orders, see MAR.

## 2019-06-23 NOTE — DISCHARGE SUMMARY
Discharge Summary:      Claire Amato      Admit Date:   2019  Discharge Date:  2019     Admitting diagnosis:  Pregnancy  Pregnancy  Discharge Diagnosis: Status post  for cholestasis desires c/sec with BTL  Pregnancy Complications: cholestasis,  at 36 weeks and 4 days  Tubal Ligation:  yes        History:  Past Medical History:   Diagnosis Date   • Varicose vein of leg 4/15/2010     OB History    Para Term  AB Living   5 5 3 2   5   SAB TAB Ectopic Molar Multiple Live Births           0 5      # Outcome Date GA Lbr Alejandro/2nd Weight Sex Delivery Anes PTL Lv   5  19 36w4d  3.25 kg (7 lb 2.6 oz) M CS-LTranv Spinal N NARDA      Complications: Cholestasis during pregnancy in third trimester   4 Term 11/03/10 39w0d  3.884 kg (8 lb 9 oz) M Vag-Spont None N NARDA      Complications: Postpartum hemorrhage      Birth Comments: HEMORRAGE, BREASTFEEDING    3 Term 08 40w0d 02:00 3.43 kg (7 lb 9 oz) M Vag-Spont None Y NARDA      Complications: Postpartum hemorrhage      Birth Comments: HTN at 38 weeks- no meds per pt. .    2  04 36w0d 00:30 3.345 kg (7 lb 6 oz) M Vag-Spont None N NARDA      Birth Comments: SROM 36weeks, baby stayed in ICU x 1 week   1 Term 03 40w0d 08:00 3.402 kg (7 lb 8 oz) M SPONTANEOUS EPI  NARDA           Nkda [no known drug allergy]  Patient Active Problem List    Diagnosis Date Noted   • Cholestasis during pregnancy in third trimester 2019   • History of anxiety 2019   • Supervision of other high risk pregnancies, third trimester 2019   • Elderly multigravida in third trimester 2019   • History of PPHx 2 - desires primary C/S and BTL (requested) 2018   • HTN (hypertension)-late last pregnancy without meds.  10/28/2010   • Varicose vein of leg 04/15/2010        Hospital Course:   39 y.o. , now para 5, was admitted with the above mentioned diagnosis, underwent Primary  desires c/sec with  tubal ligation, cholestasis with elevated transaminases,. Patient postpartum course was unremarkable, with progressive advancement in diet , ambulation and toleration of oral analgesia. Patient without complaints today and desires discharge.      Vitals:    06/21/19 1800 06/22/19 0600 06/22/19 1800 06/23/19 0600   BP: 117/67 106/61 116/72 (!) 97/63   Pulse: 90 71 88 68   Resp: 18 18 16 18   Temp: 36.3 °C (97.4 °F) 36.4 °C (97.5 °F) 37.2 °C (99 °F) 36.2 °C (97.1 °F)   TempSrc: Temporal Temporal Temporal Oral   SpO2: 97% 97% 96%    Weight:       Height:           Current Facility-Administered Medications   Medication Dose   • acetaminophen (TYLENOL) tablet 650 mg  650 mg   • oxyCODONE-acetaminophen (PERCOCET) 5-325 MG per tablet 1 Tab  1 Tab   • oxyCODONE-acetaminophen (PERCOCET-10)  MG per tablet 1 Tab  1 Tab   • morphine (pf) 4 mg/ml injection 4 mg  4 mg   • ibuprofen (MOTRIN) tablet 800 mg  800 mg   • lactated ringers (LR) infusion     • LR infusion     • PRN oxytocin (PITOCIN) (20 Units/1000 mL) PRN for excessive uterine bleeding - See Admin Instr  125-999 mL/hr   • miSOPROStol (CYTOTEC) tablet 800 mcg  800 mcg   • docusate sodium (COLACE) capsule 100 mg  100 mg   • simethicone (MYLICON) chewable tab 80 mg  80 mg   • diphenhydrAMINE (BENADRYL) injection 25 mg  25 mg    Or   • diphenhydrAMINE (BENADRYL) tablet/capsule 25 mg  25 mg   • ondansetron (ZOFRAN) syringe/vial injection 4 mg  4 mg    Or   • ondansetron (ZOFRAN ODT) dispertab 4 mg  4 mg       Exam:  Breast Exam: negative  Abdomen: Abdomen soft, non-tender. BS normal. No masses,  No organomegaly  Fundus Non Tender: yes  Incision: dry and intact  Perineum: perineum intact  Extremity: extremities, peripheral pulses and reflexes normal     Labs:  Recent Labs      06/20/19   0755  06/20/19 2006   WBC  8.2  13.2*   RBC  4.82  3.61*   HEMOGLOBIN  14.1  10.2*   HEMATOCRIT  42.9  31.6*   MCV  89.0  89.2   MCH  29.3  28.0   MCHC  32.9*  31.4*   RDW  41.8  42.0    PLATELETCT  222  167   MPV  13.2*  12.5        Activity:   Discharge to home  Pelvic Rest x 6 weeks    Assessment:  Stable      Follow up: .TPC 1 week for incision check.      Discharge Meds:   Current Outpatient Prescriptions   Medication Sig Dispense Refill   • oxyCODONE-acetaminophen (PERCOCET) 5-325 MG Tab Take 1 Tab by mouth every 6 hours as needed (for Moderate Pain (Pain Scale 4-6) after delivery) for up to 7 days. 30 Tab 0   • ibuprofen (MOTRIN) 600 MG Tab Take 1 Tab by mouth every 6 hours as needed. 30 Tab 3   • ferrous sulfate 325 (65 Fe) MG EC tablet Take 1 Tab by mouth 3 times a day, with meals. 90 Tab 3   • docusate sodium (COLACE) 100 MG Cap Take 1 Cap by mouth 2 times a day. 60 Cap 3       Alexus Rapp M.D.

## 2019-06-23 NOTE — DISCHARGE INSTRUCTIONS
POSTPARTUM DISCHARGE INSTRUCTIONS FOR MOM    YOB: 1980   Age: 39 y.o.               Admit Date: 2019     Discharge Date: 2019  Attending Doctor:  Jose Grant M.D.                  Allergies:  Nkda [no known drug allergy]    Discharged to home by car. Discharged via wheelchair, hospital escort: Yes.  Special equipment needed: None  Belongings with: Personal  Be sure to schedule a follow-up appointment with your primary care doctor or any specialists as instructed.     Discharge Plan:   Diet Plan: Discussed  Activity Level: Discussed  Confirmed Follow up Appointment: Patient to Call and Schedule Appointment  Confirmed Symptoms Management: Discussed  Medication Reconciliation Updated: Yes  Influenza Vaccine Indication: Indicated: Not available from distributor/    REASONS TO CALL YOUR OBSTETRICIAN:  1.   Persistent fever or shaking chills (Temperature higher than 100.4)  2.   Heavy bleeding (soaking more than 1 pad per hour); Passing clots  3.   Foul odor from vagina  4.   Mastitis (Breast infection; breast pain, chills, fever, redness)  5.   Urinary pain, burning or frequency  6.   Episiotomy infection  7.   Abdominal incision infection  8.   Severe depression longer than 24 hours    HAND WASHING  · Prior to handling the baby.  · Before breastfeeding or bottle feeding baby.  · After using the bathroom or changing the baby's diaper.    WOUND CARE  Ask your physician for additional care instructions.  In general:    ·  Incision:      · Keep clean and dry.    · Do NOT lift anything heavier than your baby for up to 6 weeks.    · There should not be any opening or pus.      VAGINAL CARE  · Nothing inside vagina for 6 weeks: no sexual intercourse, tampons or douching.  · Bleeding may continue for 2-4 weeks.  Amount may vary.    · Call your physician for heavy bleeding which means soaking more than 1 pad per hour    BIRTH CONTROL  · It is possible to become pregnant at any time  "after delivery and while breastfeeding.  · Plan to discuss a method of birth control with your physician at your follow up visit. visit.    DIET AND ELIMINATION  · Eating more fiber (bran cereal, fruits, and vegetables) and drinking plenty of fluids will help to avoid constipation.  · Urinary frequency after childbirth is normal.    POSTPARTUM BLUES  During the first few days after birth, you may experience a sense of the \"blues\" which may include impatience, irritability or even crying.  These feeling come and go quickly.  However, as many as 1 in 10 women experience emotional symptoms known as postpartum depression.    Postpartum depression:  May start as early as the second or third day after delivery or take several weeks or months to develop.  Symptoms of \"blues\" are present, but are more intense:  Crying spells; loss of appetite; feelings of hopelessness or loss of control; fear of touching the baby; over concern or no concern at all about the baby; little or no concern about your own appearance/caring for yourself; and/or inability to sleep or excessive sleeping.  Contact your physician if you are experiencing any of these symptoms.    Crisis Hotline:  · [place] Crisis Hotline:  5-570-CDDCYXS  Or 1-913.119.4575  · [place] Crisis Hotline:  1-142.737.6870  Or 539-990-2004    PREVENTING SHAKEN BABY:  If you are angry or stressed, PUT THE BABY IN THE CRIB, step away, take some deep breaths, and wait until you are calm to care for the baby.  DO NOT SHAKE THE BABY.  You are not alone, call a supporter for help.    · Crisis Call Center 24/7 crisis line 132-018-5297 or 1-748.877.4952  · You can also text them, text \"ANSWER\" to 261277    QUIT SMOKING/TOBACCO USE:  I understand the use of any tobacco products increases my chance of suffering from future heart disease and could cause other illnesses which may shorten my life. Quitting the use of tobacco products is the single most important thing I can do to improve my " health. For further information on smoking / tobacco cessation call a Toll Free Quit Line at 1-990.641.9654 (*[employer] [place]) or 1-865.969.2604 (American Lung Association) or you can access the web based program at www.lungusa.org.    · [place] Tobacco Users Help Line:  (146) 230-2234       Toll Free: 1-825.161.3474  · Quit Tobacco Program [facility] (074)361-1813    DEPRESSION / SUICIDE RISK:  As you are discharged from this [facility], it is important to learn how to keep safe from harming yourself.    Recognize the warning signs:  · Abrupt changes in personality, positive or negative- including increase in energy   · Giving away possessions  · Change in eating patterns- significant weight changes-  positive or negative  · Change in sleeping patterns- unable to sleep or sleeping all the time   · Unwillingness or inability to communicate  · Depression  · Unusual sadness, discouragement and loneliness  · Talk of wanting to die  · Neglect of personal appearance   · Rebelliousness- reckless behavior  · Withdrawal from people/activities they love  · Confusion- inability to concentrate     If you or a loved one observes any of these behaviors or has concerns about self-harm, here's what you can do:  · Talk about it- your feelings and reasons for harming yourself  · Remove any means that you might use to hurt yourself (examples: pills, rope, extension cords, firearm)  · Get professional help from the community (Mental Health, Substance Abuse, psychological counseling)  · Do not be alone:Call your Safe Contact- someone whom you trust who will be there for you.  · Call your local CRISIS HOTLINE 732-4111 or 080-220-6384  · Call your local Children's Mobile Crisis Response Team [place] (520) 745-6085 or www.[employer]  · Call the toll free National Suicide Prevention Hotlines   · National Suicide Prevention Lifeline 152-758-WZXV (0561)  · National Hope Line Network  800-SUICIDE (529-9704)    DISCHARGE SURVEY:  [facility] you for choosing [facility].  We hope we provided you with very good care.  You may be receiving a survey in the mail.  Please fill it out.  Your opinion is valuable to us.    ADDITIONAL EDUCATIONAL MATERIALS GIVEN TO PATIENT:        My signature on this form indicates that:  1.  I have reviewed and understand the above information  2.  My questions regarding this information have been answered to my satisfaction.  3.  I have formulated a plan with my discharge nurse to obtain my prescribed medication for home.

## 2019-06-23 NOTE — PROGRESS NOTES
Pt is A&Ox4. FOB is at bedside. VSS. Denies pain.  Up self, walked halls tonight.  Fundus is firm, lochia is light with no clots.  Dressings to abdomen are CDI.  MOB and FOB participating in infant care.  Call light within reach and working properly.

## 2019-07-01 ENCOUNTER — POST PARTUM (OUTPATIENT)
Dept: OBGYN | Facility: CLINIC | Age: 39
End: 2019-07-01
Payer: COMMERCIAL

## 2019-07-01 VITALS — WEIGHT: 202 LBS | SYSTOLIC BLOOD PRESSURE: 112 MMHG | DIASTOLIC BLOOD PRESSURE: 60 MMHG | BODY MASS INDEX: 34.66 KG/M2

## 2019-07-01 DIAGNOSIS — Z86.59 HISTORY OF ANXIETY: ICD-10-CM

## 2019-07-01 DIAGNOSIS — O09.299 HISTORY OF POSTPARTUM HEMORRHAGE, CURRENTLY PREGNANT: ICD-10-CM

## 2019-07-01 DIAGNOSIS — Z98.891 STATUS POST CESAREAN SECTION ROUTINE POSTPARTUM FOLLOW-UP: ICD-10-CM

## 2019-07-01 PROBLEM — K83.1 CHOLESTASIS DURING PREGNANCY IN THIRD TRIMESTER: Status: RESOLVED | Noted: 2019-06-11 | Resolved: 2019-07-01

## 2019-07-01 PROBLEM — O09.893 SUPERVISION OF OTHER HIGH RISK PREGNANCIES, THIRD TRIMESTER: Status: RESOLVED | Noted: 2019-01-16 | Resolved: 2019-07-01

## 2019-07-01 PROBLEM — O26.613 CHOLESTASIS DURING PREGNANCY IN THIRD TRIMESTER: Status: RESOLVED | Noted: 2019-06-11 | Resolved: 2019-07-01

## 2019-07-01 PROBLEM — O09.523 ELDERLY MULTIGRAVIDA IN THIRD TRIMESTER: Status: RESOLVED | Noted: 2019-01-16 | Resolved: 2019-07-01

## 2019-07-01 PROCEDURE — 99024 POSTOP FOLLOW-UP VISIT: CPT | Performed by: OBSTETRICS & GYNECOLOGY

## 2019-07-01 NOTE — PROGRESS NOTES
Claire Amato is a 39 y.o.  who returns to clinic today for her 2 week post partum/post op appointment after /BTL performed at 36w4d on 19 for cholestasis.  She reports that since being seen last she has been doing well.  She is bonding well with her infant and she is formula and breastfeeding.  She has not yet had a menstral cycle.  She is not longer taking any pain medications except occasional ibuprofen.  She is participating in her usual activities but has followed the lifting precautions and has not yet resumed intercourse.  Has occasionally been getting headaches which is easily resolved with ibuprofen.  Denies any vision changes, right upper quadrant pain, or any elevated blood pressures at home.  Denies dizziness, abnormal vaginal discharge, urinary symptoms, bowel complaints, or other concerns at this time.    All other systems are reviewed and are negative.    PMH, PSH, SH, and FH reviewed with patient today - changes made in chart.    LMP 10/07/2018    Ambulatory Vitals  Encounter Vitals  Blood Pressure: 112/60  Weight: 91.6 kg (202 lb)  Breasts: no masses, engorgement, pain or erythema  CVS: RRR  Resp: CTA bilaterally  Abdomen: Abdomen soft, non-tender.  No masses,  No organomegaly  Incision: well healed without erythema or induration.   Extremities: no cyanosis, clubbing, no edema    Lab: No results found for this or any previous visit (from the past 48 hour(s)).    [place] 2    Claire Amato is a 39 y.o.  returns to clinic today for her 6 week post partum/post op appointmet after .  Pregnancy c/b h/o PPH, cholestasis,   #post partum/post op.  Meeting all milestones.  Discussed she needs to refrain from intercourse and keep with  lifting precautions until 6 wks post op  ?Breastfeeding/formula feeding - encouraged trying to continue with BF as able.  Tried to trouble shoot some issues with her.  Declines laction appt.   #Cervical cancer screening.   Last pap 1/19 but wasn't done with HPV so will need another pap (w/ cotesting) in 3 years - 1/2022    #HTN in pregnancy.  BP WNL today - asymptomatic.  Discussed warning signs of PReE and when to go to hospital  #Post partum depression score. Low risk  #Post partum birth control method: s/p BTL  #Recommend follow up in  A month for 6wk or sooner if issues or concerns arise.  CARO

## 2019-07-01 NOTE — PROGRESS NOTES
Pt here for C/S check delivered on 6/20/19  Currently bottle feeding.   # [phone]  Pt states having some pain on her incision, Pt states occasionally feels itchy all over her body.

## 2019-07-29 ENCOUNTER — POST PARTUM (OUTPATIENT)
Dept: OBGYN | Facility: CLINIC | Age: 39
End: 2019-07-29
Payer: COMMERCIAL

## 2019-07-29 VITALS — BODY MASS INDEX: 34.83 KG/M2 | WEIGHT: 203 LBS | SYSTOLIC BLOOD PRESSURE: 118 MMHG | DIASTOLIC BLOOD PRESSURE: 66 MMHG

## 2019-07-29 PROCEDURE — 0503F POSTPARTUM CARE VISIT: CPT | Performed by: NURSE PRACTITIONER

## 2019-07-29 ASSESSMENT — ENCOUNTER SYMPTOMS
CONSTITUTIONAL NEGATIVE: 1
RESPIRATORY NEGATIVE: 1
GASTROINTESTINAL NEGATIVE: 1
EYES NEGATIVE: 1
CARDIOVASCULAR NEGATIVE: 1
MUSCULOSKELETAL NEGATIVE: 1
NEUROLOGICAL NEGATIVE: 1
PSYCHIATRIC NEGATIVE: 1

## 2019-07-29 NOTE — PROGRESS NOTES
Pt here today for postpartum exam.  Delivery Date and Type 06/20/19 C section   Currently: bottle  BCM[facility] BTL, [facility].   Mood. good  LMP: N/A  Good ph:[phone]

## 2019-07-29 NOTE — PROGRESS NOTES
Subjective:      Claire Amato is a 39 y.o. female who presents with No chief complaint on file.            S   40 y/o now  s/p primary c/s on 19 of baby boy weighing 7 without complications. No laceration. Now 5 weeks postpartum. Prenatal course significant for cholestasis. Postpartum course without any complications. Feeling well and happy with baby, denies any severe mood swings or s/sx of postpartum depression and anxiety.    Baby is doing well,  formula exclusively.  Has not resumed sexual activity.  Mother reports no issues with bowel or bladder routine, continued regular diet. Bleeding since birth has subsided at this time with no return to menses. Denies incisional pain, drainage or redness. No vaginal pain/odor/itching, fever, headaches, dizziness/SOB or dysuria. S/p BTL. .     O  See PE: Physical exam today with no abnormal findings  Vital signs WNL: BP and weight   H/H: 13.2>10.2/31.6<167  PAP: NILM on 2019    A  Reassuring exam of lactating postpartum woman s/p primary c/s on 19     P  - S/p BTL; reviewed that no method is 100% except abstinence but does not feel need to use another method along with tubal  - Resumption of sexual activity: safe sex precautions given  - Counseling on nutrition, adequate hydration, and exercise   - Counseling on PAP guidelines re: next PAP due 2022  - Warning s/sx of postpartum infection, depression, preeclampsia   - RTC PRN             Review of Systems   Constitutional: Negative.    HENT: Negative.    Eyes: Negative.    Respiratory: Negative.    Cardiovascular: Negative.    Gastrointestinal: Negative.    Musculoskeletal: Negative.    Skin: Negative.    Neurological: Negative.    Endo/Heme/Allergies: Negative.    Psychiatric/Behavioral: Negative.           Objective:     /66   Wt 92.1 kg (203 lb)   LMP 10/07/2018   BMI 34.83 kg/m²      Physical Exam   Constitutional: She is oriented to person, place, and time. She appears well-developed  and well-nourished.   HENT:   Head: Normocephalic and atraumatic.   Eyes: EOM are normal.   Neck: Normal range of motion. Neck supple. No thyromegaly present.   Cardiovascular: Normal rate and regular rhythm.    Pulmonary/Chest: Effort normal and breath sounds normal. Right breast exhibits no inverted nipple, no mass, no nipple discharge, no skin change and no tenderness. Left breast exhibits no inverted nipple, no mass, no nipple discharge, no skin change and no tenderness. Breasts are symmetrical. There is no breast swelling.   Abdominal: Soft.   Incision well healed and approximated without s/sx of infection    Genitourinary: Vagina normal and uterus normal. No breast tenderness, discharge or bleeding.   Musculoskeletal: Normal range of motion.   Neurological: She is alert and oriented to person, place, and time.   Skin: Skin is warm and dry.   Psychiatric: She has a normal mood and affect. Her behavior is normal. Judgment and thought content normal.               Assessment/Plan:     There are no diagnoses linked to this encounter.

## 2019-09-21 NOTE — CARE PLAN
Problem: Altered physiologic condition related to postoperative  delivery  Goal: Patient physiologically stable as evidenced by normal lochia, palpable uterine involution and vital signs within normal limits  Outcome: PROGRESSING AS EXPECTED  Assessment WNL. VSS.     Problem: Alteration in comfort related to surgical incision and/or after birth pains  Goal: Patient is able to ambulate, care for self and infant with acceptable pain level  Outcome: PROGRESSING AS EXPECTED  Pt cites adequate relief of pain with pain medication provided. Pt will call if she needs pain medication. Pt verbalizes understanding.        Pt sleeping.    Viktoriya Neumann RN on 9/20/2019 at 11:43 PM

## 2022-04-19 ENCOUNTER — HOSPITAL ENCOUNTER (OUTPATIENT)
Dept: RADIOLOGY | Facility: MEDICAL CENTER | Age: 42
End: 2022-04-19
Attending: SURGERY
Payer: COMMERCIAL

## 2022-04-19 DIAGNOSIS — I83.90 ASYMPTOMATIC VARICOSE VEINS: ICD-10-CM

## 2022-04-19 PROCEDURE — 93970 EXTREMITY STUDY: CPT

## 2022-04-26 PROCEDURE — 93970 EXTREMITY STUDY: CPT | Mod: 26 | Performed by: INTERNAL MEDICINE

## (undated) DEVICE — KIT  I.V. START (100EA/CA)

## (undated) DEVICE — SHEATH RO 4F 10CM (10EA/BX)

## (undated) DEVICE — SLEEVE, SEQUENTIAL CALF REG

## (undated) DEVICE — SUTURE 1 CHROMIC CTX ETHICON - (36PK/BX)

## (undated) DEVICE — TUBING CLEARLINK DUO-VENT - C-FLO (48EA/CA)

## (undated) DEVICE — SUTURE 3-0 VICRYL PLUS CT-1 - 36 INCH (36/BX)

## (undated) DEVICE — GLOVE BIOGEL SZ 8 SURGICAL PF LTX - (50PR/BX 4BX/CA)

## (undated) DEVICE — CANISTER SUCTION 3000ML MECHANICAL FILTER AUTO SHUTOFF MEDI-VAC NONSTERILE LF DISP  (40EA/CA)

## (undated) DEVICE — SUTURE 0 VICRYL PLUS CT 36 (36PK/BX)"

## (undated) DEVICE — DRESSING INTERCEED ABSORBABLE ADHESION BARRIER TC7 (10EA/CA)

## (undated) DEVICE — PACK C-SECTION (2EA/CA)

## (undated) DEVICE — CATHETER IV NON-SAFETY 18 GA X 1 1/4 (50/BX 4BX/CA)

## (undated) DEVICE — TRAY BLADDER CARE W/ 16 FR FOLEY CATHETER STATLOCK  (10/CA)

## (undated) DEVICE — DETERGENT RENUZYME PLUS 10 OZ PACKET (50/BX)

## (undated) DEVICE — WATER IRRIG. STER. 1500 ML - (9/CA)

## (undated) DEVICE — TAPE CLOTH MEDIPORE 6 INCH - (12RL/CA)

## (undated) DEVICE — SUTURE 0 VICRYL PLUS CT-1 - 36 INCH (36/BX)

## (undated) DEVICE — HEAD HOLDER JUNIOR/ADULT

## (undated) DEVICE — SUTURE 2-0 CHROMIC GUT CT-1 27 (36PK/BX)"

## (undated) DEVICE — SET EXTENSION WITH 2 PORTS (48EA/CA) ***PART #2C8610 IS A SUBSTITUTE*****

## (undated) DEVICE — CHLORAPREP 26 ML APPLICATOR - ORANGE TINT(25/CA)

## (undated) DEVICE — ELECTRODE DUAL RETURN W/ CORD - (50/PK)

## (undated) DEVICE — SODIUM CHL IRRIGATION 0.9% 1000ML (12EA/CA)

## (undated) DEVICE — LIGASURE SM JAW SEALER CRVD - (6EA/CA)

## (undated) DEVICE — SUTURE 0 GUT-PLAIN (36PK/BX)

## (undated) DEVICE — BLANKET UNDERBODY FULL ACCES - (5/CA)

## (undated) DEVICE — TRAY SPINAL ANESTHESIA NON-SAFETY (10/CA)

## (undated) DEVICE — STAPLER SKIN DISP - (6/BX 10BX/CA) VISISTAT